# Patient Record
Sex: MALE | Race: WHITE
[De-identification: names, ages, dates, MRNs, and addresses within clinical notes are randomized per-mention and may not be internally consistent; named-entity substitution may affect disease eponyms.]

---

## 2017-06-10 ENCOUNTER — HOSPITAL ENCOUNTER (EMERGENCY)
Dept: HOSPITAL 35 - ER | Age: 52
LOS: 1 days | Discharge: HOME | End: 2017-06-11
Payer: COMMERCIAL

## 2017-06-10 VITALS — HEIGHT: 70 IN | WEIGHT: 280.01 LBS | BODY MASS INDEX: 40.09 KG/M2

## 2017-06-10 DIAGNOSIS — Z88.6: ICD-10-CM

## 2017-06-10 DIAGNOSIS — F17.210: ICD-10-CM

## 2017-06-10 DIAGNOSIS — K29.70: Primary | ICD-10-CM

## 2017-06-10 DIAGNOSIS — E11.9: ICD-10-CM

## 2017-06-10 DIAGNOSIS — I10: ICD-10-CM

## 2017-06-10 LAB
ALBUMIN SERPL-MCNC: 3.8 G/DL (ref 3.4–5)
ALP SERPL-CCNC: 154 U/L (ref 46–116)
ALT SERPL-CCNC: 90 U/L (ref 30–65)
ANION GAP SERPL CALC-SCNC: 10 MMOL/L (ref 7–16)
AST SERPL-CCNC: 47 U/L (ref 15–37)
BASOPHILS NFR BLD AUTO: 0.9 % (ref 0–2)
BILIRUB SERPL-MCNC: 0.3 MG/DL
BUN SERPL-MCNC: 8 MG/DL (ref 7–18)
CALCIUM SERPL-MCNC: 9 MG/DL (ref 8.5–10.1)
CHLORIDE SERPL-SCNC: 100 MMOL/L (ref 98–107)
CO2 SERPL-SCNC: 28 MMOL/L (ref 21–32)
CREAT SERPL-MCNC: 1 MG/DL (ref 0.7–1.3)
EOSINOPHIL NFR BLD: 1 % (ref 0–3)
ERYTHROCYTE [DISTWIDTH] IN BLOOD BY AUTOMATED COUNT: 15.1 % (ref 10.5–14.5)
GLUCOSE SERPL-MCNC: 142 MG/DL (ref 74–106)
GRANULOCYTES NFR BLD MANUAL: 67.5 % (ref 36–66)
HCT VFR BLD CALC: 46.6 % (ref 42–52)
HGB BLD-MCNC: 15.9 GM/DL (ref 14–18)
LYMPHOCYTES NFR BLD AUTO: 22.2 % (ref 24–44)
MANUAL DIFFERENTIAL PERFORMED BLD QL: NO
MCH RBC QN AUTO: 29.9 PG (ref 26–34)
MCHC RBC AUTO-ENTMCNC: 34.1 G/DL (ref 28–37)
MCV RBC: 87.6 FL (ref 80–100)
MONOCYTES NFR BLD: 8.4 % (ref 1–8)
NEUTROPHILS # BLD: 7 THOU/UL (ref 1.4–8.2)
PLATELET # BLD: 226 THOU/UL (ref 150–400)
POTASSIUM SERPL-SCNC: 4.3 MMOL/L (ref 3.5–5.1)
PROT SERPL-MCNC: 7.8 G/DL (ref 6.4–8.2)
RBC # BLD AUTO: 5.32 MIL/UL (ref 4.5–6)
SODIUM SERPL-SCNC: 138 MMOL/L (ref 136–145)
WBC # BLD AUTO: 10.4 THOU/UL (ref 4–11)

## 2017-06-11 VITALS — DIASTOLIC BLOOD PRESSURE: 84 MMHG | SYSTOLIC BLOOD PRESSURE: 156 MMHG

## 2017-06-11 LAB
AMP/METHAMP: POSITIVE
BILIRUB UR-MCNC: NEGATIVE MG/DL
COLOR UR: YELLOW
KETONES UR STRIP-MCNC: NEGATIVE MG/DL
OPIATES UR-MCNC: POSITIVE NG/ML
RBC # UR STRIP: NEGATIVE /UL
SP GR UR STRIP: 1.01 (ref 1–1.03)
URINE GLUCOSE-RANDOM*: NEGATIVE
URINE LEUKOCYTES-REFLEX: NEGATIVE
URINE PROTEIN (DIPSTICK): NEGATIVE
UROBILINOGEN UR STRIP-ACNC: 0.2 E.U./DL (ref 0.2–1)

## 2018-11-21 ENCOUNTER — HOSPITAL ENCOUNTER (EMERGENCY)
Dept: HOSPITAL 35 - ER | Age: 53
Discharge: HOME | End: 2018-11-21
Payer: COMMERCIAL

## 2018-11-21 VITALS — BODY MASS INDEX: 44.38 KG/M2 | WEIGHT: 310.01 LBS | HEIGHT: 70 IN

## 2018-11-21 VITALS — DIASTOLIC BLOOD PRESSURE: 77 MMHG | SYSTOLIC BLOOD PRESSURE: 138 MMHG

## 2018-11-21 DIAGNOSIS — F17.210: ICD-10-CM

## 2018-11-21 DIAGNOSIS — E11.9: ICD-10-CM

## 2018-11-21 DIAGNOSIS — J20.9: Primary | ICD-10-CM

## 2018-11-21 DIAGNOSIS — R07.89: ICD-10-CM

## 2018-11-21 DIAGNOSIS — Z88.6: ICD-10-CM

## 2018-11-21 DIAGNOSIS — I10: ICD-10-CM

## 2018-11-24 ENCOUNTER — HOSPITAL ENCOUNTER (EMERGENCY)
Dept: HOSPITAL 35 - ER | Age: 53
Discharge: HOME | End: 2018-11-24
Payer: COMMERCIAL

## 2018-11-24 VITALS — DIASTOLIC BLOOD PRESSURE: 88 MMHG | SYSTOLIC BLOOD PRESSURE: 153 MMHG

## 2018-11-24 VITALS — WEIGHT: 310.01 LBS | HEIGHT: 70 IN | BODY MASS INDEX: 44.38 KG/M2

## 2018-11-24 DIAGNOSIS — B96.81: Primary | ICD-10-CM

## 2018-11-24 DIAGNOSIS — I10: ICD-10-CM

## 2018-11-24 DIAGNOSIS — Z88.6: ICD-10-CM

## 2018-11-24 DIAGNOSIS — E11.9: ICD-10-CM

## 2018-11-24 DIAGNOSIS — F17.210: ICD-10-CM

## 2018-11-24 LAB
ALBUMIN SERPL-MCNC: 3.7 G/DL (ref 3.4–5)
ALT SERPL-CCNC: 67 U/L (ref 30–65)
ANION GAP SERPL CALC-SCNC: 6 MMOL/L (ref 7–16)
AST SERPL-CCNC: 33 U/L (ref 15–37)
BASOPHILS NFR BLD AUTO: 1.1 % (ref 0–2)
BILIRUB DIRECT SERPL-MCNC: < 0.1 MG/DL
BILIRUB SERPL-MCNC: 0.4 MG/DL
BILIRUB UR-MCNC: NEGATIVE MG/DL
BUN SERPL-MCNC: 20 MG/DL (ref 7–18)
CALCIUM SERPL-MCNC: 9.1 MG/DL (ref 8.5–10.1)
CHLORIDE SERPL-SCNC: 104 MMOL/L (ref 98–107)
CO2 SERPL-SCNC: 29 MMOL/L (ref 21–32)
COLOR UR: YELLOW
CREAT SERPL-MCNC: 1.1 MG/DL (ref 0.7–1.3)
EOSINOPHIL NFR BLD: 3.7 % (ref 0–3)
ERYTHROCYTE [DISTWIDTH] IN BLOOD BY AUTOMATED COUNT: 14.2 % (ref 10.5–14.5)
GLUCOSE SERPL-MCNC: 112 MG/DL (ref 74–106)
GRANULOCYTES NFR BLD MANUAL: 61.7 % (ref 36–66)
HCT VFR BLD CALC: 46.5 % (ref 42–52)
HGB BLD-MCNC: 15.8 GM/DL (ref 14–18)
KETONES UR STRIP-MCNC: NEGATIVE MG/DL
LIPASE: 202 U/L (ref 73–393)
LYMPHOCYTES NFR BLD AUTO: 22.6 % (ref 24–44)
MCH RBC QN AUTO: 29.8 PG (ref 26–34)
MCHC RBC AUTO-ENTMCNC: 33.9 G/DL (ref 28–37)
MCV RBC: 87.8 FL (ref 80–100)
MONOCYTES NFR BLD: 10.9 % (ref 1–8)
NEUTROPHILS # BLD: 5.3 THOU/UL (ref 1.4–8.2)
NITRITE UR QL STRIP: NEGATIVE
PLATELET # BLD: 262 THOU/UL (ref 150–400)
POTASSIUM SERPL-SCNC: 4.4 MMOL/L (ref 3.5–5.1)
PROT SERPL-MCNC: 8.2 G/DL (ref 6.4–8.2)
RBC # BLD AUTO: 5.29 MIL/UL (ref 4.5–6)
RBC # UR STRIP: NEGATIVE /UL
SODIUM SERPL-SCNC: 139 MMOL/L (ref 136–145)
SP GR UR STRIP: >= 1.03 (ref 1–1.03)
TROPONIN I SERPL-MCNC: <0.06 NG/ML (ref ?–0.06)
URINE CLARITY: CLEAR
URINE GLUCOSE-RANDOM*: NEGATIVE
URINE LEUKOCYTES: NEGATIVE
URINE PROTEIN (DIPSTICK): NEGATIVE
UROBILINOGEN UR STRIP-ACNC: 0.2 E.U./DL (ref 0.2–1)
WBC # BLD AUTO: 8.7 THOU/UL (ref 4–11)

## 2019-01-20 ENCOUNTER — HOSPITAL ENCOUNTER (EMERGENCY)
Dept: HOSPITAL 35 - ER | Age: 54
Discharge: HOME | End: 2019-01-20
Payer: COMMERCIAL

## 2019-01-20 VITALS — BODY MASS INDEX: 37.94 KG/M2 | WEIGHT: 265 LBS | HEIGHT: 70 IN

## 2019-01-20 VITALS — DIASTOLIC BLOOD PRESSURE: 68 MMHG | SYSTOLIC BLOOD PRESSURE: 144 MMHG
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
## 2019-01-20 DIAGNOSIS — R10.12: Primary | ICD-10-CM

## 2019-01-20 DIAGNOSIS — Z79.899: ICD-10-CM

## 2019-01-20 DIAGNOSIS — I10: ICD-10-CM
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
## 2019-01-20 DIAGNOSIS — R74.8: ICD-10-CM

## 2019-01-20 DIAGNOSIS — E11.9: ICD-10-CM

## 2019-01-20 DIAGNOSIS — Z90.49: ICD-10-CM

## 2019-01-20 DIAGNOSIS — F17.210: ICD-10-CM

## 2019-01-20 DIAGNOSIS — S22.32XA: ICD-10-CM
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
## 2019-01-20 DIAGNOSIS — Z88.6: ICD-10-CM
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
## 2019-01-20 LAB
ALBUMIN SERPL-MCNC: 3.6 G/DL (ref 3.4–5)
ALT SERPL-CCNC: 31 U/L (ref 30–65)
AMP/METHAMP: POSITIVE
ANION GAP SERPL CALC-SCNC: 13 MMOL/L (ref 7–16)
AST SERPL-CCNC: 23 U/L (ref 15–37)
BILIRUB SERPL-MCNC: 0.4 MG/DL
BILIRUB UR-MCNC: NEGATIVE MG/DL
BUN SERPL-MCNC: 17 MG/DL (ref 7–18)
CALCIUM SERPL-MCNC: 9.8 MG/DL (ref 8.5–10.1)
CHLORIDE SERPL-SCNC: 103 MMOL/L (ref 98–107)
CO2 SERPL-SCNC: 23 MMOL/L (ref 21–32)
COLOR UR: YELLOW
CREAT SERPL-MCNC: 1.5 MG/DL (ref 0.7–1.3)
ERYTHROCYTE [DISTWIDTH] IN BLOOD BY AUTOMATED COUNT: 14.9 % (ref 10.5–14.5)
GLUCOSE SERPL-MCNC: 154 MG/DL (ref 74–106)
HCT VFR BLD CALC: 47.2 % (ref 42–52)
HGB BLD-MCNC: 16.1 GM/DL (ref 14–18)
KETONES UR STRIP-MCNC: NEGATIVE MG/DL
LIPASE: 507 U/L (ref 73–393)
MCH RBC QN AUTO: 29.5 PG (ref 26–34)
MCHC RBC AUTO-ENTMCNC: 34 G/DL (ref 28–37)
MCV RBC: 86.8 FL (ref 80–100)
OPIATES UR-MCNC: POSITIVE NG/ML
PLATELET # BLD: 291 THOU/UL (ref 150–400)
POTASSIUM SERPL-SCNC: 3.8 MMOL/L (ref 3.5–5.1)
PROT SERPL-MCNC: 7.2 G/DL (ref 6.4–8.2)
RBC # BLD AUTO: 5.44 MIL/UL (ref 4.5–6)
RBC # UR STRIP: NEGATIVE /UL
SODIUM SERPL-SCNC: 139 MMOL/L (ref 136–145)
SP GR UR STRIP: 1.01 (ref 1–1.03)
TROPONIN I SERPL-MCNC: <0.06 NG/ML (ref ?–0.06)
URINE CLARITY: CLEAR
URINE GLUCOSE-RANDOM*: NEGATIVE
URINE LEUKOCYTES-REFLEX: NEGATIVE
URINE NITRITE-REFLEX: NEGATIVE
URINE PROTEIN (DIPSTICK): (no result)
UROBILINOGEN UR STRIP-ACNC: 0.2 E.U./DL (ref 0.2–1)
WBC # BLD AUTO: 9.5 THOU/UL (ref 4–11)

## 2019-01-20 NOTE — EKG
[address] [address] [address]
[place]  [address]
Phone:  [phone]                    ELECTROCARDIOGRAM REPORT      
_______________________________________________________________________________
 
Name:       FLORENITNO PERALTA MIGUEL ANGEL             [facility]                     [id number]     Account #:      [id number]  
Admission:  19    Attend Phys:                          
Discharge:              Date of Birth:  [birth year]  
                                                          Report #: [id number]
   [id number]
_______________________________________________________________________________
THIS REPORT FOR:   //name//                          
 
                         [facility] ED
                                       
Test Date:    2019               Test Time:    09:06:15
Pat Name:     FLORENTINO PERALTA             Department:   
Patient ID:   [id number]            Room:          
Gender:       M                        Technician:   as
:          1965               Requested By: Too Bal
Order Number: [id number] MD:   Shoaib Gooden
                                 Measurements
Intervals                              Axis          
Rate:         110                      P:            46
CT:           144                      QRS:          46
QRSD:         96                       T:            62
QT:           342                                    
QTc:          463                                    
                           Interpretive Statements
Sinus tachycardia
Otherwise no significant abnormality
Compared to ECG 2018 09:27:22
heart rate has increased
Electronically Signed On 2019 12:06:48 CST by Shoaib Gooden
[email or web address]
 
 
 
 
 
 
 
 
 
 
 
 
 
 
 
 
 
 
 
  <ELECTRONICALLY SIGNED>
   By: Shoaib Gooden MD, [facility]   
  19     1206
D: 19 0906                           _____________________________________
T: 19                           Shoaib Gooden MD, FACC     /EPI

## 2019-07-10 ENCOUNTER — HOSPITAL ENCOUNTER (INPATIENT)
Dept: HOSPITAL 35 - ER | Age: 54
LOS: 8 days | Discharge: HOME | DRG: 871 | End: 2019-07-18
Attending: INTERNAL MEDICINE | Admitting: INTERNAL MEDICINE
Payer: COMMERCIAL

## 2019-07-10 VITALS — DIASTOLIC BLOOD PRESSURE: 92 MMHG | SYSTOLIC BLOOD PRESSURE: 134 MMHG

## 2019-07-10 VITALS — SYSTOLIC BLOOD PRESSURE: 151 MMHG | DIASTOLIC BLOOD PRESSURE: 76 MMHG

## 2019-07-10 VITALS — WEIGHT: 309.7 LBS | BODY MASS INDEX: 44.34 KG/M2 | HEIGHT: 70 IN

## 2019-07-10 VITALS — DIASTOLIC BLOOD PRESSURE: 83 MMHG | SYSTOLIC BLOOD PRESSURE: 145 MMHG

## 2019-07-10 DIAGNOSIS — Z88.6: ICD-10-CM

## 2019-07-10 DIAGNOSIS — E11.22: ICD-10-CM

## 2019-07-10 DIAGNOSIS — F17.210: ICD-10-CM

## 2019-07-10 DIAGNOSIS — E66.01: ICD-10-CM

## 2019-07-10 DIAGNOSIS — I13.0: ICD-10-CM

## 2019-07-10 DIAGNOSIS — E88.09: ICD-10-CM
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
## 2019-07-10 DIAGNOSIS — Z71.6: ICD-10-CM

## 2019-07-10 DIAGNOSIS — L03.116: ICD-10-CM

## 2019-07-10 DIAGNOSIS — E44.0: ICD-10-CM

## 2019-07-10 DIAGNOSIS — A41.9: Primary | ICD-10-CM
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
## 2019-07-10 DIAGNOSIS — Z83.3: ICD-10-CM

## 2019-07-10 DIAGNOSIS — L03.115: ICD-10-CM
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
## 2019-07-10 DIAGNOSIS — S80.821A: ICD-10-CM

## 2019-07-10 DIAGNOSIS — N18.9: ICD-10-CM
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
## 2019-07-10 DIAGNOSIS — Z87.11: ICD-10-CM

## 2019-07-10 DIAGNOSIS — I87.2: ICD-10-CM

## 2019-07-10 DIAGNOSIS — I50.31: ICD-10-CM

## 2019-07-10 DIAGNOSIS — N17.9: ICD-10-CM

## 2019-07-10 LAB
ANION GAP SERPL CALC-SCNC: 13 MMOL/L (ref 7–16)
BASOPHILS NFR BLD AUTO: 1 % (ref 0–2)
BUN SERPL-MCNC: 13 MG/DL (ref 7–18)
CALCIUM SERPL-MCNC: 8.4 MG/DL (ref 8.5–10.1)
CHLORIDE SERPL-SCNC: 99 MMOL/L (ref 98–107)
CO2 SERPL-SCNC: 22 MMOL/L (ref 21–32)
CREAT SERPL-MCNC: 1.4 MG/DL (ref 0.7–1.3)
ERYTHROCYTE [DISTWIDTH] IN BLOOD BY AUTOMATED COUNT: 15.4 % (ref 10.5–14.5)
GLUCOSE SERPL-MCNC: 144 MG/DL (ref 74–106)
GRANULOCYTES NFR BLD MANUAL: 75 % (ref 36–66)
HCT VFR BLD CALC: 46.2 % (ref 42–52)
HGB BLD-MCNC: 15.4 GM/DL (ref 14–18)
LYMPHOCYTES NFR BLD AUTO: 6 % (ref 24–44)
MCH RBC QN AUTO: 29.2 PG (ref 26–34)
MCHC RBC AUTO-ENTMCNC: 33.4 G/DL (ref 28–37)
MCV RBC: 87.5 FL (ref 80–100)
MONOCYTES NFR BLD: 2 % (ref 1–8)
NEUTROPHILS # BLD: 19.5 THOU/UL (ref 1.4–8.2)
NEUTS BAND NFR BLD: 16 % (ref 0–8)
PLATELET # BLD EST: NORMAL 10*3/UL
PLATELET # BLD: 179 THOU/UL (ref 150–400)
POTASSIUM SERPL-SCNC: 3.9 MMOL/L (ref 3.5–5.1)
RBC # BLD AUTO: 5.28 MIL/UL (ref 4.5–6)
RBC MORPH BLD: NORMAL
SODIUM SERPL-SCNC: 134 MMOL/L (ref 136–145)
TROPONIN I SERPL-MCNC: <0.06 NG/ML (ref ?–0.06)
WBC # BLD AUTO: 21.4 THOU/UL (ref 4–11)

## 2019-07-10 PROCEDURE — 10081 I&D PILONIDAL CYST COMP: CPT

## 2019-07-10 PROCEDURE — 10783: CPT

## 2019-07-11 VITALS — SYSTOLIC BLOOD PRESSURE: 146 MMHG | DIASTOLIC BLOOD PRESSURE: 86 MMHG

## 2019-07-11 VITALS — SYSTOLIC BLOOD PRESSURE: 129 MMHG | DIASTOLIC BLOOD PRESSURE: 58 MMHG

## 2019-07-11 VITALS — SYSTOLIC BLOOD PRESSURE: 140 MMHG | DIASTOLIC BLOOD PRESSURE: 77 MMHG

## 2019-07-11 VITALS — DIASTOLIC BLOOD PRESSURE: 70 MMHG | SYSTOLIC BLOOD PRESSURE: 121 MMHG

## 2019-07-11 VITALS — SYSTOLIC BLOOD PRESSURE: 120 MMHG | DIASTOLIC BLOOD PRESSURE: 48 MMHG

## 2019-07-11 VITALS — SYSTOLIC BLOOD PRESSURE: 126 MMHG | DIASTOLIC BLOOD PRESSURE: 64 MMHG

## 2019-07-11 VITALS — DIASTOLIC BLOOD PRESSURE: 81 MMHG | SYSTOLIC BLOOD PRESSURE: 139 MMHG

## 2019-07-11 LAB
ALBUMIN SERPL-MCNC: 2.7 G/DL (ref 3.4–5)
ALT SERPL-CCNC: 47 U/L (ref 30–65)
ANION GAP SERPL CALC-SCNC: 11 MMOL/L (ref 7–16)
AST SERPL-CCNC: 24 U/L (ref 15–37)
BILIRUB SERPL-MCNC: 0.9 MG/DL
BILIRUB UR-MCNC: NEGATIVE MG/DL
BUN SERPL-MCNC: 17 MG/DL (ref 7–18)
CALCIUM SERPL-MCNC: 7.9 MG/DL (ref 8.5–10.1)
CHLORIDE SERPL-SCNC: 102 MMOL/L (ref 98–107)
CO2 SERPL-SCNC: 24 MMOL/L (ref 21–32)
COLOR UR: YELLOW
CREAT SERPL-MCNC: 1.4 MG/DL (ref 0.7–1.3)
ERYTHROCYTE [DISTWIDTH] IN BLOOD BY AUTOMATED COUNT: 15.4 % (ref 10.5–14.5)
GLUCOSE SERPL-MCNC: 117 MG/DL (ref 74–106)
HCT VFR BLD CALC: 43.7 % (ref 42–52)
HGB BLD-MCNC: 14.4 GM/DL (ref 14–18)
KETONES UR STRIP-MCNC: NEGATIVE MG/DL
MAGNESIUM SERPL-MCNC: 1.7 MG/DL (ref 1.8–2.4)
MCH RBC QN AUTO: 29.4 PG (ref 26–34)
MCHC RBC AUTO-ENTMCNC: 33 G/DL (ref 28–37)
MCV RBC: 89.2 FL (ref 80–100)
PLATELET # BLD: 168 THOU/UL (ref 150–400)
POTASSIUM SERPL-SCNC: 4.2 MMOL/L (ref 3.5–5.1)
PROT SERPL-MCNC: 7.3 G/DL (ref 6.4–8.2)
RBC # BLD AUTO: 4.89 MIL/UL (ref 4.5–6)
RBC # UR STRIP: NEGATIVE /UL
RBC #/AREA URNS HPF: (no result) /HPF (ref 0–2)
SODIUM SERPL-SCNC: 137 MMOL/L (ref 136–145)
SP GR UR STRIP: >= 1.03 (ref 1–1.03)
SQUAMOUS: (no result) /LPF (ref 0–3)
URINE CLARITY: (no result)
URINE GLUCOSE-RANDOM*: NEGATIVE
URINE LEUKOCYTES-REFLEX: NEGATIVE
URINE NITRITE-REFLEX: NEGATIVE
URINE PROTEIN (DIPSTICK): (no result)
URINE WBC-REFLEX: (no result) /HPF (ref 0–5)
UROBILINOGEN UR STRIP-ACNC: 2 E.U./DL (ref 0.2–1)
WBC # BLD AUTO: 17.1 THOU/UL (ref 4–11)

## 2019-07-12 VITALS — SYSTOLIC BLOOD PRESSURE: 151 MMHG | DIASTOLIC BLOOD PRESSURE: 73 MMHG

## 2019-07-12 VITALS — SYSTOLIC BLOOD PRESSURE: 144 MMHG | DIASTOLIC BLOOD PRESSURE: 80 MMHG

## 2019-07-12 VITALS — DIASTOLIC BLOOD PRESSURE: 88 MMHG | SYSTOLIC BLOOD PRESSURE: 102 MMHG

## 2019-07-12 VITALS — SYSTOLIC BLOOD PRESSURE: 150 MMHG | DIASTOLIC BLOOD PRESSURE: 83 MMHG

## 2019-07-12 LAB
EST. AVERAGE GLUCOSE BLD GHB EST-MCNC: 143 MG/DL
GLYCOHEMOGLOBIN (HGB A1C): 6.6 % (ref 4.8–5.6)

## 2019-07-13 VITALS — SYSTOLIC BLOOD PRESSURE: 145 MMHG | DIASTOLIC BLOOD PRESSURE: 85 MMHG

## 2019-07-13 VITALS — DIASTOLIC BLOOD PRESSURE: 85 MMHG | SYSTOLIC BLOOD PRESSURE: 145 MMHG

## 2019-07-13 VITALS — DIASTOLIC BLOOD PRESSURE: 89 MMHG | SYSTOLIC BLOOD PRESSURE: 148 MMHG

## 2019-07-13 VITALS — SYSTOLIC BLOOD PRESSURE: 129 MMHG | DIASTOLIC BLOOD PRESSURE: 60 MMHG

## 2019-07-13 VITALS — SYSTOLIC BLOOD PRESSURE: 143 MMHG | DIASTOLIC BLOOD PRESSURE: 72 MMHG

## 2019-07-13 LAB
ALBUMIN SERPL-MCNC: 2.2 G/DL (ref 3.4–5)
ALT SERPL-CCNC: 36 U/L (ref 30–65)
ANION GAP SERPL CALC-SCNC: 8 MMOL/L (ref 7–16)
ANISOCYTOSIS BLD QL SMEAR: (no result)
AST SERPL-CCNC: 37 U/L (ref 15–37)
BILIRUB SERPL-MCNC: 0.4 MG/DL
BUN SERPL-MCNC: 14 MG/DL (ref 7–18)
CALCIUM SERPL-MCNC: 8 MG/DL (ref 8.5–10.1)
CHLORIDE SERPL-SCNC: 102 MMOL/L (ref 98–107)
CO2 SERPL-SCNC: 25 MMOL/L (ref 21–32)
CREAT SERPL-MCNC: 0.9 MG/DL (ref 0.7–1.3)
ERYTHROCYTE [DISTWIDTH] IN BLOOD BY AUTOMATED COUNT: 16.1 % (ref 10.5–14.5)
GLUCOSE SERPL-MCNC: 105 MG/DL (ref 74–106)
GRANULOCYTES NFR BLD MANUAL: 77 % (ref 36–66)
HCT VFR BLD CALC: 41.6 % (ref 42–52)
HGB BLD-MCNC: 13.3 GM/DL (ref 14–18)
LYMPHOCYTES NFR BLD AUTO: 16 % (ref 24–44)
MAGNESIUM SERPL-MCNC: 1.9 MG/DL (ref 1.8–2.4)
MCH RBC QN AUTO: 29.4 PG (ref 26–34)
MCHC RBC AUTO-ENTMCNC: 32 G/DL (ref 28–37)
MCV RBC: 91.8 FL (ref 80–100)
MONOCYTES NFR BLD: 3 % (ref 1–8)
NEUTROPHILS # BLD: 7.2 THOU/UL (ref 1.4–8.2)
NEUTS BAND NFR BLD: 4 % (ref 0–8)
PLATELET # BLD: 146 THOU/UL (ref 150–400)
POTASSIUM SERPL-SCNC: 3.9 MMOL/L (ref 3.5–5.1)
PROT SERPL-MCNC: 7 G/DL (ref 6.4–8.2)
RBC # BLD AUTO: 4.53 MIL/UL (ref 4.5–6)
SODIUM SERPL-SCNC: 135 MMOL/L (ref 136–145)
WBC # BLD AUTO: 8.9 THOU/UL (ref 4–11)

## 2019-07-14 VITALS — SYSTOLIC BLOOD PRESSURE: 156 MMHG | DIASTOLIC BLOOD PRESSURE: 88 MMHG

## 2019-07-14 VITALS — SYSTOLIC BLOOD PRESSURE: 143 MMHG | DIASTOLIC BLOOD PRESSURE: 72 MMHG

## 2019-07-14 VITALS — DIASTOLIC BLOOD PRESSURE: 81 MMHG | SYSTOLIC BLOOD PRESSURE: 177 MMHG

## 2019-07-14 VITALS — DIASTOLIC BLOOD PRESSURE: 84 MMHG | SYSTOLIC BLOOD PRESSURE: 142 MMHG

## 2019-07-14 VITALS — DIASTOLIC BLOOD PRESSURE: 66 MMHG | SYSTOLIC BLOOD PRESSURE: 133 MMHG

## 2019-07-14 LAB
ANION GAP SERPL CALC-SCNC: 9 MMOL/L (ref 7–16)
BUN SERPL-MCNC: 14 MG/DL (ref 7–18)
CALCIUM SERPL-MCNC: 8.4 MG/DL (ref 8.5–10.1)
CHLORIDE SERPL-SCNC: 103 MMOL/L (ref 98–107)
CO2 SERPL-SCNC: 31 MMOL/L (ref 21–32)
CREAT SERPL-MCNC: 0.9 MG/DL (ref 0.7–1.3)
ERYTHROCYTE [DISTWIDTH] IN BLOOD BY AUTOMATED COUNT: 15.4 % (ref 10.5–14.5)
GLUCOSE SERPL-MCNC: 107 MG/DL (ref 74–106)
HCT VFR BLD CALC: 43.4 % (ref 42–52)
HGB BLD-MCNC: 14.5 GM/DL (ref 14–18)
MCH RBC QN AUTO: 29.2 PG (ref 26–34)
MCHC RBC AUTO-ENTMCNC: 33.5 G/DL (ref 28–37)
MCV RBC: 87.2 FL (ref 80–100)
PLATELET # BLD: 224 THOU/UL (ref 150–400)
POTASSIUM SERPL-SCNC: 4.2 MMOL/L (ref 3.5–5.1)
RBC # BLD AUTO: 4.98 MIL/UL (ref 4.5–6)
SODIUM SERPL-SCNC: 143 MMOL/L (ref 136–145)
WBC # BLD AUTO: 12.3 THOU/UL (ref 4–11)

## 2019-07-14 PROCEDURE — 0HBKXZZ EXCISION OF RIGHT LOWER LEG SKIN, EXTERNAL APPROACH: ICD-10-PCS | Performed by: SPECIALIST

## 2019-07-15 VITALS — DIASTOLIC BLOOD PRESSURE: 60 MMHG | SYSTOLIC BLOOD PRESSURE: 135 MMHG

## 2019-07-15 VITALS — SYSTOLIC BLOOD PRESSURE: 175 MMHG | DIASTOLIC BLOOD PRESSURE: 109 MMHG

## 2019-07-15 VITALS — SYSTOLIC BLOOD PRESSURE: 160 MMHG | DIASTOLIC BLOOD PRESSURE: 106 MMHG

## 2019-07-15 VITALS — DIASTOLIC BLOOD PRESSURE: 69 MMHG | SYSTOLIC BLOOD PRESSURE: 160 MMHG

## 2019-07-15 LAB
ANISOCYTOSIS BLD QL SMEAR: (no result)
BASOPHILS NFR BLD AUTO: 1 % (ref 0–2)
EOSINOPHIL NFR BLD: 3 % (ref 0–3)
ERYTHROCYTE [DISTWIDTH] IN BLOOD BY AUTOMATED COUNT: 15.2 % (ref 10.5–14.5)
GRANULOCYTES NFR BLD MANUAL: 69 % (ref 36–66)
HCT VFR BLD CALC: 39.3 % (ref 42–52)
HGB BLD-MCNC: 13 GM/DL (ref 14–18)
LYMPHOCYTES NFR BLD AUTO: 15 % (ref 24–44)
MCH RBC QN AUTO: 28.9 PG (ref 26–34)
MCHC RBC AUTO-ENTMCNC: 33 G/DL (ref 28–37)
MCV RBC: 87.6 FL (ref 80–100)
METAMYELOCYTES NFR BLD: 1 %
MONOCYTES NFR BLD: 10 % (ref 1–8)
NEUTROPHILS # BLD: 6.5 THOU/UL (ref 1.4–8.2)
NEUTS BAND NFR BLD: 1 % (ref 0–8)
PLATELET # BLD: 205 THOU/UL (ref 150–400)
RBC # BLD AUTO: 4.49 MIL/UL (ref 4.5–6)
WBC # BLD AUTO: 9.3 THOU/UL (ref 4–11)

## 2019-07-16 VITALS — SYSTOLIC BLOOD PRESSURE: 171 MMHG | DIASTOLIC BLOOD PRESSURE: 78 MMHG

## 2019-07-16 VITALS — SYSTOLIC BLOOD PRESSURE: 124 MMHG | DIASTOLIC BLOOD PRESSURE: 82 MMHG

## 2019-07-16 VITALS — SYSTOLIC BLOOD PRESSURE: 158 MMHG | DIASTOLIC BLOOD PRESSURE: 79 MMHG

## 2019-07-16 VITALS — SYSTOLIC BLOOD PRESSURE: 139 MMHG | DIASTOLIC BLOOD PRESSURE: 73 MMHG

## 2019-07-16 VITALS — SYSTOLIC BLOOD PRESSURE: 150 MMHG | DIASTOLIC BLOOD PRESSURE: 97 MMHG

## 2019-07-17 VITALS — SYSTOLIC BLOOD PRESSURE: 134 MMHG | DIASTOLIC BLOOD PRESSURE: 80 MMHG

## 2019-07-17 VITALS — DIASTOLIC BLOOD PRESSURE: 79 MMHG | SYSTOLIC BLOOD PRESSURE: 131 MMHG

## 2019-07-17 VITALS — SYSTOLIC BLOOD PRESSURE: 151 MMHG | DIASTOLIC BLOOD PRESSURE: 81 MMHG

## 2019-07-17 VITALS — DIASTOLIC BLOOD PRESSURE: 71 MMHG | SYSTOLIC BLOOD PRESSURE: 150 MMHG

## 2019-07-18 VITALS — SYSTOLIC BLOOD PRESSURE: 137 MMHG | DIASTOLIC BLOOD PRESSURE: 75 MMHG

## 2019-07-18 VITALS — SYSTOLIC BLOOD PRESSURE: 155 MMHG | DIASTOLIC BLOOD PRESSURE: 81 MMHG

## 2019-07-18 VITALS — DIASTOLIC BLOOD PRESSURE: 75 MMHG | SYSTOLIC BLOOD PRESSURE: 137 MMHG

## 2019-07-19 ENCOUNTER — HOSPITAL ENCOUNTER (INPATIENT)
Dept: HOSPITAL 35 - ER | Age: 54
LOS: 4 days | Discharge: HOME | DRG: 854 | End: 2019-07-23
Attending: HOSPITALIST | Admitting: HOSPITALIST
Payer: COMMERCIAL

## 2019-07-19 VITALS — DIASTOLIC BLOOD PRESSURE: 66 MMHG | SYSTOLIC BLOOD PRESSURE: 113 MMHG

## 2019-07-19 VITALS — BODY MASS INDEX: 44.67 KG/M2 | WEIGHT: 312 LBS | HEIGHT: 70 IN

## 2019-07-19 VITALS — DIASTOLIC BLOOD PRESSURE: 82 MMHG | SYSTOLIC BLOOD PRESSURE: 145 MMHG

## 2019-07-19 VITALS — DIASTOLIC BLOOD PRESSURE: 76 MMHG | SYSTOLIC BLOOD PRESSURE: 147 MMHG

## 2019-07-19 VITALS — SYSTOLIC BLOOD PRESSURE: 154 MMHG | DIASTOLIC BLOOD PRESSURE: 83 MMHG

## 2019-07-19 VITALS — SYSTOLIC BLOOD PRESSURE: 142 MMHG | DIASTOLIC BLOOD PRESSURE: 81 MMHG

## 2019-07-19 DIAGNOSIS — F17.210: ICD-10-CM

## 2019-07-19 DIAGNOSIS — E11.9: ICD-10-CM

## 2019-07-19 DIAGNOSIS — Z83.3: ICD-10-CM

## 2019-07-19 DIAGNOSIS — Z87.19: ICD-10-CM

## 2019-07-19 DIAGNOSIS — A41.9: Primary | ICD-10-CM

## 2019-07-19 DIAGNOSIS — L03.115: ICD-10-CM

## 2019-07-19 DIAGNOSIS — F41.9: ICD-10-CM

## 2019-07-19 DIAGNOSIS — R65.20: ICD-10-CM

## 2019-07-19 DIAGNOSIS — I87.2: ICD-10-CM

## 2019-07-19 DIAGNOSIS — Z79.84: ICD-10-CM

## 2019-07-19 DIAGNOSIS — K59.00: ICD-10-CM

## 2019-07-19 DIAGNOSIS — L97.819: ICD-10-CM

## 2019-07-19 DIAGNOSIS — Z79.899: ICD-10-CM

## 2019-07-19 DIAGNOSIS — L03.116: ICD-10-CM

## 2019-07-19 DIAGNOSIS — E66.01: ICD-10-CM

## 2019-07-19 DIAGNOSIS — R59.0: ICD-10-CM

## 2019-07-19 DIAGNOSIS — I10: ICD-10-CM

## 2019-07-19 DIAGNOSIS — M17.0: ICD-10-CM

## 2019-07-19 DIAGNOSIS — Z88.8: ICD-10-CM

## 2019-07-19 DIAGNOSIS — E44.0: ICD-10-CM

## 2019-07-19 DIAGNOSIS — N17.9: ICD-10-CM

## 2019-07-19 LAB
ALBUMIN SERPL-MCNC: 2.9 G/DL (ref 3.4–5)
ALT SERPL-CCNC: 85 U/L (ref 30–65)
ANION GAP SERPL CALC-SCNC: 12 MMOL/L (ref 7–16)
AST SERPL-CCNC: 41 U/L (ref 15–37)
BASOPHILS NFR BLD AUTO: 0.3 % (ref 0–2)
BILIRUB SERPL-MCNC: 0.3 MG/DL
BUN SERPL-MCNC: 32 MG/DL (ref 7–18)
CALCIUM SERPL-MCNC: 8.8 MG/DL (ref 8.5–10.1)
CHLORIDE SERPL-SCNC: 101 MMOL/L (ref 98–107)
CO2 SERPL-SCNC: 26 MMOL/L (ref 21–32)
CREAT SERPL-MCNC: 2.7 MG/DL (ref 0.7–1.3)
EOSINOPHIL NFR BLD: 0.3 % (ref 0–3)
ERYTHROCYTE [DISTWIDTH] IN BLOOD BY AUTOMATED COUNT: 15.2 % (ref 10.5–14.5)
GLUCOSE SERPL-MCNC: 122 MG/DL (ref 74–106)
GRANULOCYTES NFR BLD MANUAL: 77.8 % (ref 36–66)
HCT VFR BLD CALC: 41.7 % (ref 42–52)
HGB BLD-MCNC: 13.8 GM/DL (ref 14–18)
LYMPHOCYTES NFR BLD AUTO: 10.5 % (ref 24–44)
MCH RBC QN AUTO: 28.9 PG (ref 26–34)
MCHC RBC AUTO-ENTMCNC: 33.2 G/DL (ref 28–37)
MCV RBC: 87.2 FL (ref 80–100)
MONOCYTES NFR BLD: 11.1 % (ref 1–8)
NEUTROPHILS # BLD: 11.7 THOU/UL (ref 1.4–8.2)
PLATELET # BLD: 433 THOU/UL (ref 150–400)
POTASSIUM SERPL-SCNC: 3.8 MMOL/L (ref 3.5–5.1)
PROT SERPL-MCNC: 8.7 G/DL (ref 6.4–8.2)
RBC # BLD AUTO: 4.79 MIL/UL (ref 4.5–6)
SODIUM SERPL-SCNC: 139 MMOL/L (ref 136–145)
WBC # BLD AUTO: 15 THOU/UL (ref 4–11)

## 2019-07-19 PROCEDURE — 10080 I&D PILONIDAL CYST SIMPLE: CPT

## 2019-07-19 PROCEDURE — 27000 TENOTOMY ADDUCTOR HIP PERQ: CPT

## 2019-07-19 PROCEDURE — 10879: CPT

## 2019-07-20 VITALS — DIASTOLIC BLOOD PRESSURE: 86 MMHG | SYSTOLIC BLOOD PRESSURE: 137 MMHG

## 2019-07-20 VITALS — SYSTOLIC BLOOD PRESSURE: 146 MMHG | DIASTOLIC BLOOD PRESSURE: 93 MMHG

## 2019-07-20 VITALS — SYSTOLIC BLOOD PRESSURE: 141 MMHG | DIASTOLIC BLOOD PRESSURE: 76 MMHG

## 2019-07-20 VITALS — SYSTOLIC BLOOD PRESSURE: 126 MMHG | DIASTOLIC BLOOD PRESSURE: 76 MMHG

## 2019-07-20 LAB
ANION GAP SERPL CALC-SCNC: 10 MMOL/L (ref 7–16)
BILIRUB UR-MCNC: NEGATIVE MG/DL
BUN SERPL-MCNC: 25 MG/DL (ref 7–18)
CALCIUM SERPL-MCNC: 7.9 MG/DL (ref 8.5–10.1)
CHLORIDE SERPL-SCNC: 104 MMOL/L (ref 98–107)
CO2 SERPL-SCNC: 24 MMOL/L (ref 21–32)
COLOR UR: YELLOW
CREAT SERPL-MCNC: 1.5 MG/DL (ref 0.7–1.3)
ERYTHROCYTE [DISTWIDTH] IN BLOOD BY AUTOMATED COUNT: 15.6 % (ref 10.5–14.5)
GLUCOSE SERPL-MCNC: 124 MG/DL (ref 74–106)
HCT VFR BLD CALC: 38 % (ref 42–52)
HGB BLD-MCNC: 12.5 GM/DL (ref 14–18)
KETONES UR STRIP-MCNC: NEGATIVE MG/DL
MCH RBC QN AUTO: 28.9 PG (ref 26–34)
MCHC RBC AUTO-ENTMCNC: 33 G/DL (ref 28–37)
MCV RBC: 87.7 FL (ref 80–100)
NITRITE UR QL STRIP: NEGATIVE
PLATELET # BLD: 337 THOU/UL (ref 150–400)
POTASSIUM SERPL-SCNC: 3.8 MMOL/L (ref 3.5–5.1)
RBC # BLD AUTO: 4.33 MIL/UL (ref 4.5–6)
RBC # UR STRIP: NEGATIVE /UL
SODIUM SERPL-SCNC: 138 MMOL/L (ref 136–145)
SP GR UR STRIP: 1.02 (ref 1–1.03)
URINE CLARITY: CLEAR
URINE GLUCOSE-RANDOM*: NEGATIVE
URINE LEUKOCYTES: NEGATIVE
URINE PROTEIN (DIPSTICK): NEGATIVE
UROBILINOGEN UR STRIP-ACNC: 0.2 E.U./DL (ref 0.2–1)
WBC # BLD AUTO: 8.6 THOU/UL (ref 4–11)

## 2019-07-20 NOTE — NUR
PATIENT ALERT AND ORIENTED AND RESTING MOST OF THE DAY. SEVERAL BOWEL
MOVEMENTS TODAY BUT CONTINUE DISTENDED ABDOMEN AND FIRM. CT SCAN SHOWED
ENLARGED LYMPH NODES PER DR. NOEL. PATIENT STATES HE WOULD LIKE TO GO
HOME. HE MENTIONED HIS WIFE MAY VISIT THIS EVENING. NO INSULIN NEEDED FOR POC
BLOOD SUGARS.

## 2019-07-20 NOTE — NUR
PATIENT C/O PAIN IN ABDOMEN, THIS RN SUGGESTED TO TRY TO USE THE BATHROOM FOR
A BOWELMOVEMENT. STATED HE HAD ONE YESTERDAY. HE ISN'T CONSTIPATED AND IS
PASSING GAS.  REQUESTING ORDER FOR HEATING PAD. REFUSED MAG CITRATE. ASKING
FOR MILK AND MORE FOOD AS WELL. ENCOURAGED LIQUIDS AND GAVE HIM A POPSICLE.
WILL CONTINUE TO MONITOR.

## 2019-07-20 NOTE — NUR
ASSUMED CARE OF PATIENT AT 1900. VSS, AFEBRILE. NEW ADMISSION FROM ER AT 1830.
ABLE TO URINATE WHEN OFFERED A URINAL AND STATES HE IS PASSING GAS, DID NOT
WANT TO TAKE THE MAG CITRATE. IV FLUIDS INFUSING, EDUCATION GIVEN. PICTURES OF
LEGS TAKEN. C/O PAIN IN LEGS AND INABILITY TO SLEEP. ORDER OBTAINED FOR
TEMAZEPAM. WAS ABLE TO SLEEP, AROUSES EASILY. WILL SEND URINE SAMPLE TO LAB.
POC GOALS ESTABLISHED.

## 2019-07-21 VITALS — SYSTOLIC BLOOD PRESSURE: 148 MMHG | DIASTOLIC BLOOD PRESSURE: 93 MMHG

## 2019-07-21 VITALS — DIASTOLIC BLOOD PRESSURE: 73 MMHG | SYSTOLIC BLOOD PRESSURE: 130 MMHG

## 2019-07-21 VITALS — SYSTOLIC BLOOD PRESSURE: 153 MMHG | DIASTOLIC BLOOD PRESSURE: 89 MMHG

## 2019-07-21 VITALS — DIASTOLIC BLOOD PRESSURE: 87 MMHG | SYSTOLIC BLOOD PRESSURE: 165 MMHG

## 2019-07-21 LAB
ANION GAP SERPL CALC-SCNC: 8 MMOL/L (ref 7–16)
BASOPHILS NFR BLD AUTO: 0.8 % (ref 0–2)
BUN SERPL-MCNC: 16 MG/DL (ref 7–18)
CALCIUM SERPL-MCNC: 8.3 MG/DL (ref 8.5–10.1)
CHLORIDE SERPL-SCNC: 106 MMOL/L (ref 98–107)
CO2 SERPL-SCNC: 26 MMOL/L (ref 21–32)
CREAT SERPL-MCNC: 1.2 MG/DL (ref 0.7–1.3)
EOSINOPHIL NFR BLD: 2.3 % (ref 0–3)
ERYTHROCYTE [DISTWIDTH] IN BLOOD BY AUTOMATED COUNT: 15.5 % (ref 10.5–14.5)
GLUCOSE SERPL-MCNC: 113 MG/DL (ref 74–106)
GRANULOCYTES NFR BLD MANUAL: 65.9 % (ref 36–66)
HCT VFR BLD CALC: 40.3 % (ref 42–52)
HGB BLD-MCNC: 13.4 GM/DL (ref 14–18)
LYMPHOCYTES NFR BLD AUTO: 20.8 % (ref 24–44)
MCH RBC QN AUTO: 29.3 PG (ref 26–34)
MCHC RBC AUTO-ENTMCNC: 33.2 G/DL (ref 28–37)
MCV RBC: 88.2 FL (ref 80–100)
MONOCYTES NFR BLD: 10.2 % (ref 1–8)
NEUTROPHILS # BLD: 5.3 THOU/UL (ref 1.4–8.2)
PLATELET # BLD: 305 THOU/UL (ref 150–400)
POTASSIUM SERPL-SCNC: 4.4 MMOL/L (ref 3.5–5.1)
RBC # BLD AUTO: 4.57 MIL/UL (ref 4.5–6)
SODIUM SERPL-SCNC: 140 MMOL/L (ref 136–145)
WBC # BLD AUTO: 8.1 THOU/UL (ref 4–11)

## 2019-07-21 PROCEDURE — 05HY33Z INSERTION OF INFUSION DEVICE INTO UPPER VEIN, PERCUTANEOUS APPROACH: ICD-10-PCS | Performed by: HOSPITALIST

## 2019-07-21 NOTE — NUR
CONSULTED TO PLACE A MIDLINE FOR THIS PATIENT EXPENTED TO HAVE AN EXTENDED
STAY AND A KNOWN DIFFICULT PIV ACCESS. ORDER NOTED AND A VERBAL CONSENT
OBTAINED AFTER THE PROCEDURE AS WELL AS BENIFITS AND RISK FOR INFECTION AND
DVT DISCUSSED. THE LEFT UPPER ARM CEPHALIC WAS WIDLEY PATENT. A #4F POWER
MIDLINE WAS PLACED PER HOSPITAL POLICY AFTER A BEDSIDE TIMEOUT WAS COMPLETED.
MIDLINE TRIMMED TO 14CM AND ADVANVCED WITHOUT DIFFICULTY. BRISK BLOOD RETURN
AND FLUSH. LINE SECURED AND RELEASED FOR USE

## 2019-07-21 NOTE — NUR
PT AMBULATING TO BATHROON INDEPENDENTLY AND IS TOLERATING FAIR.  MORPHINE
PROVIDING PAIN RELIEF.  DENIES NAUSEA.  RESTING COMFORTABLY.  NO NEEDS VOICED.
CALL LIGHT WITHIN REACH.  WILL CONTINUE TO PROVIDE FREQUENT OBSERVATION.

## 2019-07-22 VITALS — DIASTOLIC BLOOD PRESSURE: 84 MMHG | SYSTOLIC BLOOD PRESSURE: 152 MMHG

## 2019-07-22 VITALS — DIASTOLIC BLOOD PRESSURE: 90 MMHG | SYSTOLIC BLOOD PRESSURE: 144 MMHG

## 2019-07-22 VITALS — SYSTOLIC BLOOD PRESSURE: 149 MMHG | DIASTOLIC BLOOD PRESSURE: 99 MMHG

## 2019-07-22 VITALS — SYSTOLIC BLOOD PRESSURE: 164 MMHG | DIASTOLIC BLOOD PRESSURE: 85 MMHG

## 2019-07-22 VITALS — DIASTOLIC BLOOD PRESSURE: 78 MMHG | SYSTOLIC BLOOD PRESSURE: 143 MMHG

## 2019-07-22 LAB
APTT BLD: 27.4 SECONDS (ref 24.5–32.8)
INR PPP: 1
PROTHROMBIN TIME: 10.2 SECONDS (ref 9.3–11.4)

## 2019-07-22 PROCEDURE — 07BH3ZX EXCISION OF RIGHT INGUINAL LYMPHATIC, PERCUTANEOUS APPROACH, DIAGNOSTIC: ICD-10-PCS | Performed by: RADIOLOGY

## 2019-07-22 NOTE — NUR
INITIAL ASSESSMENT:
SANDIP reviewed chart and spoke with nursing and attending physician. Pt was
recently discharged home on 7/18 on PO abx. Pt was admitted on 7/19 with RLE
cellulitis. Pt is currently on IV abx. Pt to have lymph node bx today. SW
attempted to meet with pt at bedside. Pt was sleeping soundly during time of
visit. Per previous admission, pt lives at home with his s/o. Prior to
admission, pt was independent with ADLs. Pt does not have health insurance and
was provided with safety net clinic info and prescription discount card. Plan
is for pt to discharge home when medically stable. SANDIP is following to assist
as needed with discharge planning.

## 2019-07-22 NOTE — NUR
PT CONTINUES TO EXPERIENCE BURNING ABDOMINAL PAIN..MEDICATED WITH MORPHINE PRN
AND AFTER DIET RESUMES MAY USE NORCO..ENCOURAGE AMBULATION..

## 2019-07-22 NOTE — NUR
Pt admitted w/ abdominal pain, no urination, constipation/obstipation. Just
dc'ed on 7/18, readmitted 7/19. EMR states RLE cellulitis, w/ RLE wounds at
various stages of healing. Received consult for recurrent infection, diabetes.
Pt seen 2x in the last week or so during previous admit by RDs. Seen again
this AM. NPO for biopsy today. Pt again denies any diabetes or healthy
eating education. Does allow RD to educate on protein importance, increased
needs, and review protein sources. Recommend 1-2 sources/meal. Plan to restart
Ensure Max daily at dinner once diet restarts (150 kcals, 30 g protein).
Appetite excellent otherwise. BGs well controlled  mg/dl thus far; on
Humalog SSI. Pt remains low nutrition risk d/t adequate po and known protein
needs.

## 2019-07-23 VITALS — DIASTOLIC BLOOD PRESSURE: 52 MMHG | SYSTOLIC BLOOD PRESSURE: 136 MMHG

## 2019-07-23 VITALS — SYSTOLIC BLOOD PRESSURE: 136 MMHG | DIASTOLIC BLOOD PRESSURE: 52 MMHG

## 2019-07-23 VITALS — SYSTOLIC BLOOD PRESSURE: 134 MMHG | DIASTOLIC BLOOD PRESSURE: 74 MMHG

## 2019-07-23 NOTE — NUR
SHIFT SUMMARY: PT PROGRESSING. ALERT/ORIENTED, ABDOMINAL DISCOMFORT CONTROLLED
WITH PO PAIN MEDICATION, SR, ANTHONY LOWER LEGS WITH SKIN INTACT, SEE ASSESSMENT
FOR DETAILS. ROOM AIR, LUNGS CLEAR, TOLERATING DIET, VOIDING PER URINAL.
DISCHARGE INSTRUCTIONS AND HAND WRITTEN SCRIPT GIVEN. PT VERBALIZED
UNDERSTANDING OF DISCHARGE INSTRUCTIONS.  L UPPER ARM MIDLINE IV, DC'D. WELL
TOLERATED. PT CALLED FAMILY FOR TRANSPORTION, THEN DISCHARGED TO HOME UPON
THEIR ARRIVAL.

## 2019-07-23 NOTE — NUR
PT IN BED RESTING. REMAINS SR ON MONITOR. PT CONTINUES ON RA. PT TO POSSIBLE
D/C HOME TODAY. PT REQUIRED A FEW DOSES OF PO PAIN MEDS THROUGHOUT SHIFT.

## 2019-07-23 NOTE — NUR
DISCHARGE NOTE:
SW reviewed chart and spoke with attending physician. Pt is medically stable
for discharge home today. Pt will follow up with oncology as an outpatient for
bx results. Pt provided with safety net clinic info last week. Pt was
discharged prior to SW visit. No additional SW needs identified at this time,
but is following to assist as needed with discharge planning.

## 2019-08-30 ENCOUNTER — HOSPITAL ENCOUNTER (INPATIENT)
Dept: HOSPITAL 35 - ER | Age: 54
LOS: 2 days | Discharge: HOME | DRG: 683 | End: 2019-09-01
Attending: INTERNAL MEDICINE | Admitting: INTERNAL MEDICINE
Payer: COMMERCIAL

## 2019-08-30 VITALS — BODY MASS INDEX: 44.81 KG/M2 | HEIGHT: 70 IN | WEIGHT: 313 LBS

## 2019-08-30 VITALS — SYSTOLIC BLOOD PRESSURE: 139 MMHG | DIASTOLIC BLOOD PRESSURE: 108 MMHG

## 2019-08-30 DIAGNOSIS — Z90.49: ICD-10-CM

## 2019-08-30 DIAGNOSIS — M19.90: ICD-10-CM

## 2019-08-30 DIAGNOSIS — F17.210: ICD-10-CM

## 2019-08-30 DIAGNOSIS — Z83.3: ICD-10-CM

## 2019-08-30 DIAGNOSIS — Z79.84: ICD-10-CM

## 2019-08-30 DIAGNOSIS — I50.32: ICD-10-CM

## 2019-08-30 DIAGNOSIS — E11.9: ICD-10-CM

## 2019-08-30 DIAGNOSIS — R10.9: ICD-10-CM

## 2019-08-30 DIAGNOSIS — I11.0: ICD-10-CM

## 2019-08-30 DIAGNOSIS — Z87.11: ICD-10-CM

## 2019-08-30 DIAGNOSIS — Z71.6: ICD-10-CM

## 2019-08-30 DIAGNOSIS — E87.2: ICD-10-CM

## 2019-08-30 DIAGNOSIS — Z91.19: ICD-10-CM

## 2019-08-30 DIAGNOSIS — Z88.6: ICD-10-CM

## 2019-08-30 DIAGNOSIS — E66.01: ICD-10-CM

## 2019-08-30 DIAGNOSIS — N17.9: Primary | ICD-10-CM

## 2019-08-30 LAB
ALBUMIN SERPL-MCNC: 4 G/DL (ref 3.4–5)
ALT SERPL-CCNC: 91 U/L (ref 30–65)
ANION GAP SERPL CALC-SCNC: 14 MMOL/L (ref 7–16)
AST SERPL-CCNC: 51 U/L (ref 15–37)
BASOPHILS NFR BLD AUTO: 0.7 % (ref 0–2)
BILIRUB DIRECT SERPL-MCNC: < 0.1 MG/DL
BILIRUB SERPL-MCNC: 0.2 MG/DL
BILIRUB UR-MCNC: (no result) MG/DL
BUN SERPL-MCNC: 28 MG/DL (ref 7–18)
CALCIUM SERPL-MCNC: 8.9 MG/DL (ref 8.5–10.1)
CHLORIDE SERPL-SCNC: 102 MMOL/L (ref 98–107)
CO2 SERPL-SCNC: 23 MMOL/L (ref 21–32)
COLOR UR: YELLOW
CREAT SERPL-MCNC: 3 MG/DL (ref 0.7–1.3)
EOSINOPHIL NFR BLD: 0.2 % (ref 0–3)
ERYTHROCYTE [DISTWIDTH] IN BLOOD BY AUTOMATED COUNT: 15.7 % (ref 10.5–14.5)
FINE GRAN CASTS #/AREA URNS LPF: (no result) /LPF
GLUCOSE SERPL-MCNC: 153 MG/DL (ref 74–106)
GRANULOCYTES NFR BLD MANUAL: 76.6 % (ref 36–66)
HCT VFR BLD CALC: 46.9 % (ref 42–52)
HGB BLD-MCNC: 15.7 GM/DL (ref 14–18)
HYALINE CASTS #/AREA URNS LPF: (no result) /LPF
KETONES UR STRIP-MCNC: NEGATIVE MG/DL
LIPASE: 97 U/L (ref 73–393)
LYMPHOCYTES NFR BLD AUTO: 13.9 % (ref 24–44)
MCH RBC QN AUTO: 29.3 PG (ref 26–34)
MCHC RBC AUTO-ENTMCNC: 33.4 G/DL (ref 28–37)
MCV RBC: 87.6 FL (ref 80–100)
MONOCYTES NFR BLD: 8.6 % (ref 1–8)
MUCUS: (no result) STRN/LPF
NEUTROPHILS # BLD: 11.1 THOU/UL (ref 1.4–8.2)
PLATELET # BLD: 234 THOU/UL (ref 150–400)
POTASSIUM SERPL-SCNC: 3.8 MMOL/L (ref 3.5–5.1)
PROT SERPL-MCNC: 9 G/DL (ref 6.4–8.2)
RBC # BLD AUTO: 5.36 MIL/UL (ref 4.5–6)
RBC # UR STRIP: NEGATIVE /UL
SODIUM SERPL-SCNC: 139 MMOL/L (ref 136–145)
SP GR UR STRIP: >= 1.03 (ref 1–1.03)
SQUAMOUS: (no result) /LPF (ref 0–3)
URIC ACID CRYSTALS: (no result) /LPF
URINE CLARITY: (no result)
URINE GLUCOSE-RANDOM*: NEGATIVE
URINE LEUKOCYTES-REFLEX: NEGATIVE
URINE NITRITE-REFLEX: NEGATIVE
URINE PROTEIN (DIPSTICK): (no result)
UROBILINOGEN UR STRIP-ACNC: 0.2 E.U./DL (ref 0.2–1)
WBC # BLD AUTO: 15.7 THOU/UL (ref 4–11)

## 2019-08-30 PROCEDURE — 27000 TENOTOMY ADDUCTOR HIP PERQ: CPT

## 2019-08-30 PROCEDURE — 10084: CPT

## 2019-08-31 VITALS — SYSTOLIC BLOOD PRESSURE: 141 MMHG | DIASTOLIC BLOOD PRESSURE: 71 MMHG

## 2019-08-31 VITALS — SYSTOLIC BLOOD PRESSURE: 128 MMHG | DIASTOLIC BLOOD PRESSURE: 67 MMHG

## 2019-08-31 VITALS — SYSTOLIC BLOOD PRESSURE: 155 MMHG | DIASTOLIC BLOOD PRESSURE: 83 MMHG

## 2019-08-31 VITALS — DIASTOLIC BLOOD PRESSURE: 76 MMHG | SYSTOLIC BLOOD PRESSURE: 140 MMHG

## 2019-08-31 LAB
ALBUMIN SERPL-MCNC: 3 G/DL (ref 3.4–5)
ALT SERPL-CCNC: 69 U/L (ref 30–65)
AMP/METHAMP: POSITIVE
ANION GAP SERPL CALC-SCNC: 12 MMOL/L (ref 7–16)
AST SERPL-CCNC: 39 U/L (ref 15–37)
BILIRUB SERPL-MCNC: 0.3 MG/DL
BUN SERPL-MCNC: 26 MG/DL (ref 7–18)
CALCIUM SERPL-MCNC: 7.7 MG/DL (ref 8.5–10.1)
CHLORIDE SERPL-SCNC: 106 MMOL/L (ref 98–107)
CHOLEST SERPL-MCNC: 175 MG/DL (ref ?–200)
CO2 SERPL-SCNC: 23 MMOL/L (ref 21–32)
CREAT SERPL-MCNC: 1.9 MG/DL (ref 0.7–1.3)
ERYTHROCYTE [DISTWIDTH] IN BLOOD BY AUTOMATED COUNT: 15.7 % (ref 10.5–14.5)
GLUCOSE SERPL-MCNC: 129 MG/DL (ref 74–106)
HCT VFR BLD CALC: 41 % (ref 42–52)
HDLC SERPL-MCNC: 25 MG/DL (ref 40–?)
HGB BLD-MCNC: 13.5 GM/DL (ref 14–18)
LDLC SERPL-MCNC: 121 MG/DL (ref ?–100)
MCH RBC QN AUTO: 29 PG (ref 26–34)
MCHC RBC AUTO-ENTMCNC: 33.1 G/DL (ref 28–37)
MCV RBC: 87.6 FL (ref 80–100)
PLATELET # BLD: 229 THOU/UL (ref 150–400)
POTASSIUM SERPL-SCNC: 3.9 MMOL/L (ref 3.5–5.1)
PROT SERPL-MCNC: 7.1 G/DL (ref 6.4–8.2)
RBC # BLD AUTO: 4.67 MIL/UL (ref 4.5–6)
SODIUM SERPL-SCNC: 141 MMOL/L (ref 136–145)
TC:HDL: 7 RATIO
TRIGL SERPL-MCNC: 147 MG/DL (ref ?–150)
VLDLC SERPL CALC-MCNC: 29 MG/DL (ref ?–40)
WBC # BLD AUTO: 8 THOU/UL (ref 4–11)

## 2019-08-31 NOTE — NUR
Pt came to unit from ED approx 1315. A&ox4. Denies pain. Admission completed.
IVF infusing.  ordered UDS. Waiting to be collected when patient voids next.
Ambulates with steady gait. Comes from home. Call light within reach. Will
continue to monitor.

## 2019-09-01 VITALS — DIASTOLIC BLOOD PRESSURE: 82 MMHG | SYSTOLIC BLOOD PRESSURE: 140 MMHG

## 2019-09-01 VITALS — SYSTOLIC BLOOD PRESSURE: 144 MMHG | DIASTOLIC BLOOD PRESSURE: 86 MMHG

## 2019-09-01 VITALS — SYSTOLIC BLOOD PRESSURE: 140 MMHG | DIASTOLIC BLOOD PRESSURE: 82 MMHG

## 2019-09-01 LAB
ANION GAP SERPL CALC-SCNC: 6 MMOL/L (ref 7–16)
BUN SERPL-MCNC: 18 MG/DL (ref 7–18)
CALCIUM SERPL-MCNC: 8.3 MG/DL (ref 8.5–10.1)
CHLORIDE SERPL-SCNC: 107 MMOL/L (ref 98–107)
CO2 SERPL-SCNC: 26 MMOL/L (ref 21–32)
CREAT SERPL-MCNC: 1.2 MG/DL (ref 0.7–1.3)
EST. AVERAGE GLUCOSE BLD GHB EST-MCNC: 128 MG/DL
GLUCOSE SERPL-MCNC: 92 MG/DL (ref 74–106)
GLYCOHEMOGLOBIN (HGB A1C): 6.1 % (ref 4.8–5.6)
POTASSIUM SERPL-SCNC: 4.3 MMOL/L (ref 3.5–5.1)
SODIUM SERPL-SCNC: 139 MMOL/L (ref 136–145)

## 2019-09-01 NOTE — NUR
NOTIFIED TO DRAW LABS FOR THIS PATIENT. ON EACH ADMIT HE IS STUCK MULTIPLE
TIMES FOR PIV LINES AND LABS. HE IS REQUESTING A MIDLINE. AGREE TO MIDLINE
PLACEMENT. A #4F POWER MIDLINE WAS PLACED PER POLICY. LINE WAS TRIMMED TO 14CM
AND ADVANCED WITHOUT DIFFICULTY. BLOOD DRAWN AND LINE SECURED

## 2019-09-01 NOTE — NUR
7am-1pm
Received awake on bed. Due medications given as prescribed. A+Ox4. On room
air. On blood sugar monitoring- taken and recorded accordingly, with sliding
scale insulin prescribed. With NS at 125cc/hr infusing well at R FA. Pt up ad
christina. Assisted in ADLs. With blood works ordered for today, laboratory staff
called and mentioned that they are having a hard time taking bloods from pt
and pt mentioned he doesn't want to be stuck repeatedly, called IV nurse to
ask help drawing bloods, a/w call back, if still unable to get bloods will ask
doctor to order midline/PICC line instead. Vital signs stabe. No complaints of
pain.

## 2019-09-01 NOTE — NUR
SHIFT SUMMARY: PT IS ALERT AND ORINTED. UP AD MONIQUE. CONTINUES ON IV FLUIDS.
VOIDING LOTS. AFEBRILE. SOME EDEMA AND ERYTHEMA TO BLE.

## 2019-09-03 NOTE — EKG
[address]
[place]  [address]
Phone:  [phone]                    ELECTROCARDIOGRAM REPORT      
_______________________________________________________________________________
 
Name:       FLORENTINO PERALTA MIGUEL ANGEL             Room #:         423-1       [facility] [place]  
M.R.#:      [id number]     Account #:      [id number]  
Admission:  19    Attend Phys:    Yohannes Rodney
Discharge:  19    Date of Birth:  [birth year]  
                                                          Report #: [id number]
   [id number]
_______________________________________________________________________________
THIS REPORT FOR:   //name//                          
 
                         [facility] ED
                                       
Test Date:    2019               Test Time:    16:20:34
Pat Name:     FLORENTINO PERALTA             Department:   
Patient ID:   [id number]            Room:         [facility]
Gender:       M                        Technician:   BEBA
:          1965               Requested By: Jack Townsend
Order Number: [id number] MD:   Shoaib Gooden
                                 Measurements
Intervals                              Axis          
Rate:         117                      P:            31
NV:           142                      QRS:          60
QRSD:         91                       T:            62
QT:           333                                    
QTc:          465                                    
                           Interpretive Statements
Sinus tachycardia
Otherwise normal tracing
Compared to ECG 07/10/2019 21:45:25
No significant changes
 
Electronically Signed On 9-3-2019 7:42:38 CDT by Shoaib Gooden
[email or web address]
 
 
 
 
 
 
 
 
 
 
 
 
 
 
 
 
 
 
  <ELECTRONICALLY SIGNED>
   By: Shoaib Gooden MD, [facility]   
  19     0742
D: 19                           _____________________________________
T: 19                           Shoaib Gooden MD, [facility]     /EPI

## 2020-01-06 ENCOUNTER — HOSPITAL ENCOUNTER (INPATIENT)
Dept: HOSPITAL 35 - ER | Age: 55
LOS: 2 days | Discharge: HOME | DRG: 603 | End: 2020-01-08
Attending: HOSPITALIST | Admitting: HOSPITALIST
Payer: COMMERCIAL

## 2020-01-06 VITALS — HEIGHT: 70 IN | WEIGHT: 315 LBS | BODY MASS INDEX: 45.1 KG/M2

## 2020-01-06 VITALS — DIASTOLIC BLOOD PRESSURE: 86 MMHG | SYSTOLIC BLOOD PRESSURE: 154 MMHG

## 2020-01-06 VITALS — SYSTOLIC BLOOD PRESSURE: 160 MMHG | DIASTOLIC BLOOD PRESSURE: 113 MMHG

## 2020-01-06 VITALS — DIASTOLIC BLOOD PRESSURE: 101 MMHG | SYSTOLIC BLOOD PRESSURE: 162 MMHG

## 2020-01-06 DIAGNOSIS — Z87.11: ICD-10-CM

## 2020-01-06 DIAGNOSIS — I87.2: ICD-10-CM

## 2020-01-06 DIAGNOSIS — I50.30: ICD-10-CM

## 2020-01-06 DIAGNOSIS — E66.01: ICD-10-CM

## 2020-01-06 DIAGNOSIS — L03.115: Primary | ICD-10-CM

## 2020-01-06 DIAGNOSIS — Z88.6: ICD-10-CM

## 2020-01-06 DIAGNOSIS — Z23: ICD-10-CM

## 2020-01-06 DIAGNOSIS — I11.0: ICD-10-CM

## 2020-01-06 DIAGNOSIS — M19.90: ICD-10-CM

## 2020-01-06 DIAGNOSIS — F17.210: ICD-10-CM

## 2020-01-06 DIAGNOSIS — Z79.4: ICD-10-CM

## 2020-01-06 DIAGNOSIS — L97.919: ICD-10-CM

## 2020-01-06 DIAGNOSIS — Z87.81: ICD-10-CM

## 2020-01-06 DIAGNOSIS — E11.9: ICD-10-CM

## 2020-01-06 LAB
ALBUMIN SERPL-MCNC: 3.4 G/DL (ref 3.4–5)
ALT SERPL-CCNC: 44 U/L (ref 30–65)
ANION GAP SERPL CALC-SCNC: 7 MMOL/L (ref 7–16)
AST SERPL-CCNC: 26 U/L (ref 15–37)
BASOPHILS NFR BLD AUTO: 0.8 % (ref 0–2)
BILIRUB SERPL-MCNC: 0.3 MG/DL
BUN SERPL-MCNC: 15 MG/DL (ref 7–18)
CALCIUM SERPL-MCNC: 8.9 MG/DL (ref 8.5–10.1)
CHLORIDE SERPL-SCNC: 105 MMOL/L (ref 98–107)
CO2 SERPL-SCNC: 29 MMOL/L (ref 21–32)
CREAT SERPL-MCNC: 1.1 MG/DL (ref 0.7–1.3)
EOSINOPHIL NFR BLD: 1.3 % (ref 0–3)
ERYTHROCYTE [DISTWIDTH] IN BLOOD BY AUTOMATED COUNT: 14.8 % (ref 10.5–14.5)
GLUCOSE SERPL-MCNC: 119 MG/DL (ref 74–106)
GRANULOCYTES NFR BLD MANUAL: 68.1 % (ref 36–66)
HCT VFR BLD CALC: 45.8 % (ref 42–52)
HGB BLD-MCNC: 14.6 GM/DL (ref 14–18)
LYMPHOCYTES NFR BLD AUTO: 20.4 % (ref 24–44)
MCH RBC QN AUTO: 27.9 PG (ref 26–34)
MCHC RBC AUTO-ENTMCNC: 31.9 G/DL (ref 28–37)
MCV RBC: 87.6 FL (ref 80–100)
MONOCYTES NFR BLD: 9.4 % (ref 1–8)
NEUTROPHILS # BLD: 6.3 THOU/UL (ref 1.4–8.2)
PLATELET # BLD: 244 THOU/UL (ref 150–400)
POTASSIUM SERPL-SCNC: 4.3 MMOL/L (ref 3.5–5.1)
PROT SERPL-MCNC: 8 G/DL (ref 6.4–8.2)
RBC # BLD AUTO: 5.23 MIL/UL (ref 4.5–6)
SODIUM SERPL-SCNC: 141 MMOL/L (ref 136–145)
WBC # BLD AUTO: 9.3 THOU/UL (ref 4–11)

## 2020-01-06 PROCEDURE — 10040 EXTRACTION: CPT

## 2020-01-06 NOTE — NUR
ED NURSE CALLED TO GIVE REPORT, WAS TOLD BY YAKOV "I JUST FOUND OUT ABOUT IT
LET ME CALL YOU BACK" ED NURSE ADVISED HER THAT WE WILL NEED TO MOVE THE PT
QUICKLY, NURSE ERNESTO

## 2020-01-06 NOTE — NUR
1455- PT. REQUESTS TO SEE NURSE BECAUSE HE IS ITCHING. PT. HAS DIFUSE ERRYTHEMA
TO THE UPPER TORSO AND FACE. CONTACTED JORDAN FNP FOR FURTHER ORDERS AND
PROVIDED BENADRYL AS ORDERED. SYMPTOMS IMPROVED WITHIN 20 MINUTES.

## 2020-01-07 VITALS — DIASTOLIC BLOOD PRESSURE: 73 MMHG | SYSTOLIC BLOOD PRESSURE: 132 MMHG

## 2020-01-07 VITALS — DIASTOLIC BLOOD PRESSURE: 88 MMHG | SYSTOLIC BLOOD PRESSURE: 162 MMHG

## 2020-01-07 VITALS — SYSTOLIC BLOOD PRESSURE: 150 MMHG | DIASTOLIC BLOOD PRESSURE: 75 MMHG

## 2020-01-07 LAB
ANION GAP SERPL CALC-SCNC: 5 MMOL/L (ref 7–16)
BUN SERPL-MCNC: 16 MG/DL (ref 7–18)
CALCIUM SERPL-MCNC: 8.4 MG/DL (ref 8.5–10.1)
CHLORIDE SERPL-SCNC: 102 MMOL/L (ref 98–107)
CO2 SERPL-SCNC: 29 MMOL/L (ref 21–32)
CREAT SERPL-MCNC: 1.1 MG/DL (ref 0.7–1.3)
ERYTHROCYTE [DISTWIDTH] IN BLOOD BY AUTOMATED COUNT: 14.7 % (ref 10.5–14.5)
GLUCOSE SERPL-MCNC: 92 MG/DL (ref 74–106)
HCT VFR BLD CALC: 43 % (ref 42–52)
HGB BLD-MCNC: 13.9 GM/DL (ref 14–18)
MCH RBC QN AUTO: 28.5 PG (ref 26–34)
MCHC RBC AUTO-ENTMCNC: 32.5 G/DL (ref 28–37)
MCV RBC: 87.8 FL (ref 80–100)
PLATELET # BLD: 234 THOU/UL (ref 150–400)
POTASSIUM SERPL-SCNC: 4.1 MMOL/L (ref 3.5–5.1)
RBC # BLD AUTO: 4.89 MIL/UL (ref 4.5–6)
SODIUM SERPL-SCNC: 136 MMOL/L (ref 136–145)
WBC # BLD AUTO: 6.9 THOU/UL (ref 4–11)

## 2020-01-07 NOTE — NUR
PT ADMITTED RELATED TO Right Lower Extremety Cellulitis w/ open wounds. CM
REVIEWED CHART AND SPOKE WITH CARE TEAM. CM MET WITH PT AT BEDSIDE THIS DAY.
PT IS A&O X4. CM ROLE INTRODCUED. PT INDICATED HE LIVES IN A HOUSE WITH SHANICE HYDE AND HER FATHER. PT INDICATED 3 STEPS TO ENTER AND NONE INSIDE. PT
INDICATED HE HAD BEEN INDEPDENET WITH GAIT AND ADLS PTA. PT INDICATED NO HH OR
OP THERAPY HX. PT INDICATED HE HAS A PCP AT [facility]. PT INDICATED HE PLANS
TO RETURN HOME ONCE MEDICALLY STABLE. CM TO FOLLOW AS INDICATED WITH DC
PLANNING.

## 2020-01-07 NOTE — NUR
Assumed pt care at 7am.Pt in bed sleeping on and off.Assessment completed.vss.
Pt tolerated med and diet.Pain med give x2 this shift for rt leg pain.Dr Lee here,order noted.Martha change done to rt leg as ordered.Will continue
to monitor.

## 2020-01-07 NOTE — NUR
Nutrition: pt admitted with right lower extremity cellulitis with open
wounds. Wound care consult. Familiar with pt from prior admits. Stable wts
since summer. BMI > 40, extreme class 3 obesity. Pt has refused dietary
education and past for weight loss/DM. Continues to deny needs. Did review
protein needs with pt and will start ensure max daily. BG 86-96 and A1C 6.1.
Typical appetite excellent. Low nutrition risk with interventions in place.

## 2020-01-07 NOTE — NUR
admit
 
pt admitted to room 457 from ed being admitted with right lower extremety
cellulitis. admission completed. iv antibiotics administered as ordered.
taking hydrocodone for pain with effect. wounds cleansed with normal saline
xeroform gauze applied and wrapped with kerlix, photos taken per protocal see
chart.

## 2020-01-08 VITALS — SYSTOLIC BLOOD PRESSURE: 148 MMHG | DIASTOLIC BLOOD PRESSURE: 94 MMHG

## 2020-01-08 VITALS — SYSTOLIC BLOOD PRESSURE: 143 MMHG | DIASTOLIC BLOOD PRESSURE: 98 MMHG

## 2020-01-08 VITALS — DIASTOLIC BLOOD PRESSURE: 81 MMHG | SYSTOLIC BLOOD PRESSURE: 153 MMHG

## 2020-01-08 VITALS — SYSTOLIC BLOOD PRESSURE: 153 MMHG | DIASTOLIC BLOOD PRESSURE: 81 MMHG

## 2020-01-08 NOTE — HC
[facility]
[address]
[place]   [address]                     CONSULTATION                  
_______________________________________________________________________________
 
Name:       FLORENTINO PERALTA MIGUEL ANGEL             Room #:         457-P       [facility] [place]  
..#:      [id number]                       Account #:      [id number]  
Admission:  01/06/20    Attend Phys:    Pepe Dave MD    
Discharge:  01/08/20    Date of Birth:  [birth year]  
                                                          Report #: [id number]
                                                                    [id number]   
_______________________________________________________________________________
THIS REPORT FOR:   //name//                          
 
CC: Pepe Dave
    [facility] physician/PCP
    NO PCP
 
DATE OF SERVICE:  01/07/2020
 
 
CHIEF COMPLAINT:  Cellulitis and venous ulcerations to the right leg.
 
HISTORY OF PRESENT ILLNESS:  This is a 54-year-old male patient with whom I am
familiar from previous hospitalizations.  He developed some swelling and
drainage from his right leg about a week ago.  It has become progressively
severe and he presented to the Emergency Department and has been admitted.  He
has been started on intravenous vancomycin.  He states that he is still having
some pain and drainage, but overall feels pretty well.  He denies any current
fever or chills.
 
PAST MEDICAL HISTORY:  Positive for diabetes mellitus, morbid obesity,
hypertension, ongoing venous dermatitis of both lower extremities, history of
duodenal ulcer, acute kidney injury, history of diastolic heart failure.
 
SOCIAL HISTORY:  The patient denies alcohol use.  He has been smoking cigarettes
1 pack per day for 39 years and currently smokes daily.  No recreational drug
use.
 
FAMILY HISTORY:  Noncontributory and negative for diabetes in his father. 
Positive for diabetes in his mother.
 
REVIEW OF SYSTEMS:
CONSTITUTIONAL:  The patient denies fever, chills or weight loss.
NEUROLOGICAL:  The patient denies focal weakness, numbness or tingling.
EYES:  The patient denies visual changes, redness, or drainage.
ENT:  The patient denies earache, nasal drainage, sore throat.
CARDIOVASCULAR:  The patient denies chest pain or palpitations or diaphoresis.
PULMONARY:  The patient denies cough or shortness of breath.
GASTROINTESTINAL:  Denies nausea, vomiting, diarrhea or abdominal pain.
ORTHOPEDIC:  The patient complains of pain, swelling, drainage from the right
lower extremity.
SKIN:  The patient notes the ulcerations of his right leg.  Denies other rashes.
PSYCHIATRIC:  The patient denies anxiety, irritability, depression.
ENDOCRINE:  The patient denies heat or cold intolerance, polydipsia, or
polyphagia.
Other systems in a 14-point review of systems are negative.
 
 
 
 
[address]   [address]                     CONSULTATION                  
_______________________________________________________________________________
 
Name:       FLORENTINO PERALTA MIGUEL ANGEL             Room #:         457-P       [facility] [place]  
M.R.#:      [id number]                       Account #:      [id number]  
Admission:  01/06/20    Attend Phys:    Pepe Dave MD    
Discharge:  01/08/20    Date of Birth:  [birth year]  
                                                          Report #: [id number]
                                                                    [id number]   
_______________________________________________________________________________
PHYSICAL EXAMINATION:
VITAL SIGNS:  At this time include temperature 36.3, pulse 97, respiratory rate
18, blood pressure 162/88.
GENERAL:  This is a well-developed, well-nourished patient who appears to be in
minimal distress.
HEENT:  Head normocephalic.  Nose and throat are clear.
NECK:  Supple.
LUNGS:  Clear.
HEART:  ____.
ABDOMEN:  Bowel sounds present.
EXTREMITIES:  Lower extremities demonstrate 2-3+ edema bilaterally.  He has
chronic hyperpigmentation and hemosiderin staining to both lower extremities
consistent with chronic venous stasis dermatitis.  He has 2 areas of open
ulceration on the right lower leg.  Both have some odor and some yellowish
drainage.  The wound bases are relatively clean with some granulation tissue and
a little bit of slough around the edges.  The leg itself is cellulitic and warm
and red to palpation proximal to the ulcerations.
NEUROLOGIC:  The patient is alert and oriented and appropriate.  Moving all 4
extremities spontaneously.
 
LABORATORY DATA:  White blood cell count on admission was 9.3, currently 6.9;
hemoglobin 13.9.  Sodium 136, potassium 4.1, chloride 102, CO2 of 29, BUN 16,
creatinine 1.1, calcium is 8.4, albumin is 3.4.
 
CLINICAL IMPRESSION:
1.  Venous ulcerations to the right lower extremity.
2.  Cellulitis and wound infection, right lower extremity.
3.  Venous stasis dermatitis, bilateral lower extremities.
4.  Diabetes mellitus.
5.  Morbid obesity.
 
RECOMMENDATIONS:  At this point in time, empiric antibiotic therapy would be
appropriate, pending culture and sensitivity.  We will recommend topical
gentamicin ointment, Xeroform gauze, ABD and then Kerlix and Ace.  Recommend
AmLactin lotion to the dry skin of both legs.  Elevation of the lower
extremities would be appropriate as well.  We will begin with gentle
compression.  As the cellulitis resolves, we may become more aggressive to
control edema and help with wound healing.
 
I appreciate being asked to see the patient in consultation.
 
 
 
 
  <ELECTRONICALLY SIGNED>
   By: Eric Lee MD        
  01/08/20     1842
D: 01/07/20 1600                           _____________________________________
T: 01/07/20 3945                           Eric Lee MD          /nt

## 2020-01-08 NOTE — NUR
DISCHARGE PLANNING.  DISCHARGE PLAN TO HOME WITH HOME HEALTH. PATIENT REFERRAL
FAXED TO [facility] PER REQUEST.  CALL PLACED TO [facility], SPOKE WITH DARIAN.
NOTIFIED OF REFERRAL. FOLLOWING.

## 2020-01-08 NOTE — NUR
WOUND CONSULT;
ROUNDING WITH DR RUFF. THE RIGHT LE WAS ASSESSED AND WAS VERY PAINFUL.  THE
WOUNDS ARE .
 
RECOMMENDATION; CONTINUE CURRENT TREATMENT

## 2020-01-08 NOTE — NUR
PT ALERT AND ORIENTED TIMES FOUR. VSS, PT C/O PAIN PRN PAIN MEDICATIONS GIVEN
WITH GOOD RELEIF. PT UP WALKING AROUND THE ROOM WITH STEADY GAIT. PT TOLERATES
MEDS AND MEALS. PT PROGRESSING TOWRADS POC GOALS.

## 2020-01-08 NOTE — NUR
CARE TEAM INDICATED THAT PT IS MEDICALLY STABLE TO DISCHARGE HOME THIS DAY. PT
IS TO DC ON PO ABX. CARE TEAM INDICATED THAT PT IS TO FOLLOW UP ON AN OP BASIS
FOR WOUND CARE. NO OTHER CM INTERVENTION INDICATED CASE CLOSED.

## 2020-01-08 NOTE — NUR
ASSUMED PT CARE AT 1900. PT REQUESTED PAIN MEDS SAYING AM NURSE TOLD HIM HE
WAS DUE AT 1930, EMAR REFLECTED NOT DUE UNTIL 2200. PT VERY UPSET, REFUSED
RREST OF MEDS. ALSO REFUSED MY ASSESSMENT. LAB UP TO DRAW BLOOD, TOLD HER HE
WAS FRUSTRATED WITH THE DAY HE HAD. SPOKE WITH PT, AGREED TO ALLOW ME TO GIVE
HIM ORDERED VANCO. CCALLED LATER IN SHIFT FOR PAIN MEDS. PT SLEEPING HEAVILY
MOST THE SHIFT. WILL CONTINUE TO MONITOR.

## 2020-01-13 ENCOUNTER — HOSPITAL ENCOUNTER (EMERGENCY)
Dept: HOSPITAL 35 - ER | Age: 55
Discharge: HOME | End: 2020-01-13
Payer: COMMERCIAL

## 2020-01-13 VITALS — HEIGHT: 70 IN | BODY MASS INDEX: 45.1 KG/M2 | WEIGHT: 315 LBS

## 2020-01-13 VITALS — SYSTOLIC BLOOD PRESSURE: 158 MMHG | DIASTOLIC BLOOD PRESSURE: 92 MMHG

## 2020-01-13 DIAGNOSIS — L03.115: ICD-10-CM
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
## 2020-01-13 DIAGNOSIS — E11.9: ICD-10-CM

## 2020-01-13 DIAGNOSIS — Z88.6: ICD-10-CM

## 2020-01-13 DIAGNOSIS — L27.0: Primary | ICD-10-CM

## 2020-01-13 DIAGNOSIS — T37.0X5A: ICD-10-CM

## 2020-01-13 DIAGNOSIS — M19.90: ICD-10-CM

## 2020-01-13 DIAGNOSIS — I10: ICD-10-CM

## 2020-01-13 DIAGNOSIS — F17.210: ICD-10-CM

## 2020-01-23 ENCOUNTER — HOSPITAL ENCOUNTER (EMERGENCY)
Dept: HOSPITAL 35 - ER | Age: 55
Discharge: HOME | End: 2020-01-23
Payer: COMMERCIAL

## 2020-01-23 VITALS — BODY MASS INDEX: 45.1 KG/M2 | HEIGHT: 70 IN | WEIGHT: 315 LBS

## 2020-01-23 VITALS — DIASTOLIC BLOOD PRESSURE: 93 MMHG | SYSTOLIC BLOOD PRESSURE: 165 MMHG

## 2020-01-23 DIAGNOSIS — L25.1: Primary | ICD-10-CM

## 2020-01-23 DIAGNOSIS — T36.8X5A: ICD-10-CM
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
## 2020-01-23 LAB
ALBUMIN SERPL-MCNC: 3.4 G/DL (ref 3.4–5)
ALT SERPL-CCNC: 46 U/L (ref 30–65)
ANION GAP SERPL CALC-SCNC: 11 MMOL/L (ref 7–16)
APTT BLD: 29.6 SECONDS (ref 24.5–32.8)
AST SERPL-CCNC: 24 U/L (ref 15–37)
BASOPHILS NFR BLD AUTO: 0.9 % (ref 0–2)
BILIRUB DIRECT SERPL-MCNC: < 0.1 MG/DL
BILIRUB SERPL-MCNC: 0.3 MG/DL
BUN SERPL-MCNC: 16 MG/DL (ref 7–18)
CALCIUM SERPL-MCNC: 9 MG/DL (ref 8.5–10.1)
CHLORIDE SERPL-SCNC: 104 MMOL/L (ref 98–107)
CO2 SERPL-SCNC: 26 MMOL/L (ref 21–32)
CREAT SERPL-MCNC: 1.1 MG/DL (ref 0.7–1.3)
EOSINOPHIL NFR BLD: 2 % (ref 0–3)
ERYTHROCYTE [DISTWIDTH] IN BLOOD BY AUTOMATED COUNT: 15 % (ref 10.5–14.5)
GLUCOSE SERPL-MCNC: 102 MG/DL (ref 74–106)
GRANULOCYTES NFR BLD MANUAL: 61.7 % (ref 36–66)
HCT VFR BLD CALC: 45.1 % (ref 42–52)
HGB BLD-MCNC: 14.6 GM/DL (ref 14–18)
INR PPP: 1
LYMPHOCYTES NFR BLD AUTO: 23.4 % (ref 24–44)
MCH RBC QN AUTO: 28.1 PG (ref 26–34)
MCHC RBC AUTO-ENTMCNC: 32.4 G/DL (ref 28–37)
MCV RBC: 86.7 FL (ref 80–100)
MONOCYTES NFR BLD: 12 % (ref 1–8)
NEUTROPHILS # BLD: 6 THOU/UL (ref 1.4–8.2)
PLATELET # BLD: 247 THOU/UL (ref 150–400)
POTASSIUM SERPL-SCNC: 4.1 MMOL/L (ref 3.5–5.1)
PROT SERPL-MCNC: 8 G/DL (ref 6.4–8.2)
PROTHROMBIN TIME: 10 SECONDS (ref 9.3–11.4)
RBC # BLD AUTO: 5.2 MIL/UL (ref 4.5–6)
SODIUM SERPL-SCNC: 141 MMOL/L (ref 136–145)
WBC # BLD AUTO: 9.7 THOU/UL (ref 4–11)

## 2020-01-30 ENCOUNTER — HOSPITAL ENCOUNTER (INPATIENT)
Dept: HOSPITAL 35 - ER | Age: 55
LOS: 1 days | Discharge: HOME | DRG: 607 | End: 2020-01-31
Attending: HOSPITALIST | Admitting: HOSPITALIST
Payer: COMMERCIAL

## 2020-01-30 VITALS — SYSTOLIC BLOOD PRESSURE: 134 MMHG | DIASTOLIC BLOOD PRESSURE: 71 MMHG

## 2020-01-30 VITALS — DIASTOLIC BLOOD PRESSURE: 70 MMHG | SYSTOLIC BLOOD PRESSURE: 143 MMHG

## 2020-01-30 VITALS — WEIGHT: 312 LBS | BODY MASS INDEX: 44.67 KG/M2 | HEIGHT: 70 IN

## 2020-01-30 VITALS — SYSTOLIC BLOOD PRESSURE: 194 MMHG | DIASTOLIC BLOOD PRESSURE: 145 MMHG

## 2020-01-30 VITALS — SYSTOLIC BLOOD PRESSURE: 125 MMHG | DIASTOLIC BLOOD PRESSURE: 66 MMHG

## 2020-01-30 VITALS — DIASTOLIC BLOOD PRESSURE: 87 MMHG | SYSTOLIC BLOOD PRESSURE: 151 MMHG

## 2020-01-30 DIAGNOSIS — I10: ICD-10-CM

## 2020-01-30 DIAGNOSIS — E66.01: ICD-10-CM
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
## 2020-01-30 DIAGNOSIS — L30.8: ICD-10-CM

## 2020-01-30 DIAGNOSIS — I11.0: ICD-10-CM

## 2020-01-30 DIAGNOSIS — M19.90: ICD-10-CM

## 2020-01-30 DIAGNOSIS — I83.018: ICD-10-CM

## 2020-01-30 DIAGNOSIS — Z91.19: ICD-10-CM

## 2020-01-30 DIAGNOSIS — Z87.11: ICD-10-CM

## 2020-01-30 DIAGNOSIS — L97.819: ICD-10-CM

## 2020-01-30 DIAGNOSIS — E11.9: ICD-10-CM

## 2020-01-30 DIAGNOSIS — T36.8X5A: ICD-10-CM

## 2020-01-30 DIAGNOSIS — L27.0: Primary | ICD-10-CM

## 2020-01-30 DIAGNOSIS — Z88.8: ICD-10-CM

## 2020-01-30 DIAGNOSIS — I50.32: ICD-10-CM

## 2020-01-30 DIAGNOSIS — Z71.6: ICD-10-CM

## 2020-01-30 DIAGNOSIS — F17.210: ICD-10-CM

## 2020-01-30 DIAGNOSIS — N17.9: ICD-10-CM

## 2020-01-30 LAB
ANION GAP SERPL CALC-SCNC: 13 MMOL/L (ref 7–16)
BACTERIA-REFLEX: (no result) /HPF
BASOPHILS NFR BLD AUTO: 0.6 % (ref 0–2)
BILIRUB UR-MCNC: NEGATIVE MG/DL
BUN SERPL-MCNC: 40 MG/DL (ref 7–18)
CALCIUM SERPL-MCNC: 8.9 MG/DL (ref 8.5–10.1)
CHLORIDE SERPL-SCNC: 102 MMOL/L (ref 98–107)
CO2 SERPL-SCNC: 25 MMOL/L (ref 21–32)
COLOR UR: YELLOW
CREAT SERPL-MCNC: 1.9 MG/DL (ref 0.7–1.3)
EOSINOPHIL NFR BLD: 1.2 % (ref 0–3)
ERYTHROCYTE [DISTWIDTH] IN BLOOD BY AUTOMATED COUNT: 15.4 % (ref 10.5–14.5)
GLUCOSE SERPL-MCNC: 97 MG/DL (ref 74–106)
GRANULOCYTES NFR BLD MANUAL: 60.6 % (ref 36–66)
HCT VFR BLD CALC: 46.5 % (ref 42–52)
HGB BLD-MCNC: 15 GM/DL (ref 14–18)
HYALINE CASTS #/AREA URNS LPF: (no result) /LPF
KETONES UR STRIP-MCNC: NEGATIVE MG/DL
LYMPHOCYTES NFR BLD AUTO: 26.2 % (ref 24–44)
MCH RBC QN AUTO: 28 PG (ref 26–34)
MCHC RBC AUTO-ENTMCNC: 32.2 G/DL (ref 28–37)
MCV RBC: 86.9 FL (ref 80–100)
MONOCYTES NFR BLD: 11.4 % (ref 1–8)
MUCUS: (no result) STRN/LPF
NEUTROPHILS # BLD: 7.8 THOU/UL (ref 1.4–8.2)
PLATELET # BLD: 292 THOU/UL (ref 150–400)
POTASSIUM SERPL-SCNC: 3.7 MMOL/L (ref 3.5–5.1)
RBC # BLD AUTO: 5.36 MIL/UL (ref 4.5–6)
RBC # UR STRIP: (no result) /UL
RBC #/AREA URNS HPF: (no result) /HPF (ref 0–2)
SODIUM SERPL-SCNC: 140 MMOL/L (ref 136–145)
SP GR UR STRIP: >= 1.03 (ref 1–1.03)
SQUAMOUS: (no result) /LPF (ref 0–3)
URINE CLARITY: CLEAR
URINE GLUCOSE-RANDOM*: NEGATIVE
URINE LEUKOCYTES-REFLEX: NEGATIVE
URINE NITRITE-REFLEX: NEGATIVE
URINE PROTEIN (DIPSTICK): (no result)
URINE WBC-REFLEX: (no result) /HPF (ref 0–5)
UROBILINOGEN UR STRIP-ACNC: 0.2 E.U./DL (ref 0.2–1)
WBC # BLD AUTO: 12.9 THOU/UL (ref 4–11)

## 2020-01-30 PROCEDURE — 10102: CPT

## 2020-01-30 NOTE — NUR
PT ADMITTED AT 0730. A&OX4. PT IN MODERATE PAIN WHICH IS BEING CONTROLLED BY
PAIN MEDICATION. PT IS INDEPENDENT IN THE ROOM. ADMISSION COMPLEETED. IV
PATENT WITH NO REDNESS OR EDEMA. NO SCD DUE TO LOWER EXTREMITY CELLULITIS. PT
BODY IS COVERED IN WHAT LOOKS LIKE AN ALLERGIC REACTION THAT DOES NOT GO AWAY
WHEN PRESSURE IS APPLIED. BILATERAL LOWER EXTREMITIES ARE VERY DRY AND LOTION
WILL BE APPLIED HOURLY PER DR. ARCHER. PICTURES OF LEGS TAKEN. PT IS ACHS WITH
NO COVEREGE NEEDED.

## 2020-01-30 NOTE — NUR
PT ADMITTED RELATED TO CELLULITIS. CM REVIEWED CHART AND SPOKE WITH CARE TEAM.
CM MET WITH PT AT BEDSIDE THIS DAY. PT IS A&O X4. CM ROLE INTRODUCED. PT
INDICATED HE LIVES IN A HOUSE WITH HIS FIANCE AND HER GRANDFATHER. PT
INDICATED THERE ARE 3 STEPS TO ENTER AND NONE INSIDE. PT INDICATED NO HH OR OP
THERAPY PTA. PT'S PCP IS DR. MICHELLE WEAVER. PT INDICATED HE PLANS TO RETURN HOME
ONCE MEDICALLY STABLE. PT RECEPTIVE TO HH IF RECOMMENDED UPON DC. CM TO FOLLOW
AS INDICATED WITH DC PLANNING.

## 2020-01-31 VITALS — SYSTOLIC BLOOD PRESSURE: 148 MMHG | DIASTOLIC BLOOD PRESSURE: 78 MMHG

## 2020-01-31 VITALS — SYSTOLIC BLOOD PRESSURE: 128 MMHG | DIASTOLIC BLOOD PRESSURE: 70 MMHG

## 2020-01-31 LAB
ANION GAP SERPL CALC-SCNC: 8 MMOL/L (ref 7–16)
BUN SERPL-MCNC: 29 MG/DL (ref 7–18)
CALCIUM SERPL-MCNC: 8.5 MG/DL (ref 8.5–10.1)
CHLORIDE SERPL-SCNC: 104 MMOL/L (ref 98–107)
CO2 SERPL-SCNC: 25 MMOL/L (ref 21–32)
CREAT SERPL-MCNC: 1.2 MG/DL (ref 0.7–1.3)
ERYTHROCYTE [DISTWIDTH] IN BLOOD BY AUTOMATED COUNT: 15.2 % (ref 10.5–14.5)
GLUCOSE SERPL-MCNC: 138 MG/DL (ref 74–106)
HCT VFR BLD CALC: 43.3 % (ref 42–52)
HGB BLD-MCNC: 13.9 GM/DL (ref 14–18)
MCH RBC QN AUTO: 27.8 PG (ref 26–34)
MCHC RBC AUTO-ENTMCNC: 32.1 G/DL (ref 28–37)
MCV RBC: 86.7 FL (ref 80–100)
PLATELET # BLD: 250 THOU/UL (ref 150–400)
POTASSIUM SERPL-SCNC: 4.2 MMOL/L (ref 3.5–5.1)
RBC # BLD AUTO: 4.99 MIL/UL (ref 4.5–6)
SODIUM SERPL-SCNC: 137 MMOL/L (ref 136–145)
WBC # BLD AUTO: 14.1 THOU/UL (ref 4–11)

## 2020-01-31 NOTE — NUR
ASSUMED PT CARE AROUND 2330. SLEEPING IN BED. NO S/S ACUTE DISTRESS NOTED OR
REPORTED AT THIS TIME. WILL CONT TO MONITOR FOR ANY CHANGES IN CONDITION.

## 2020-01-31 NOTE — NUR
CARE TEAM INDICATED THAT PT IS MEDICALLY STABLE TO IA HOME WITH HH SERVICES
THIS DAY. ORDERS FAXED TO [facility]. PT IS AWARE AND
AGREEABLE. PT WAS PROVIDED WITH A CAB VOUCHER FOR [facility]. NO OTHER CM
INTERVENTION INDICATED. CASE CLOSED.

## 2020-01-31 NOTE — NUR
PT DENIED PAIN SO FAR.PT HAS DRYNESS ON BLE,CREAM APPLIED TO LEGS.PT CONT TO
HAVE RED SPOTS ALL OVER HIS BODY,PT STATED THAT IT IS LOOKING BETTER.NO BM
THIS SHIFT.PT RETING ON HIS BED AT THIS TIME.REPORT TO JUAN PABLO LAM WHO TOOK OVER
CARE OF PT AT 11PM.

## 2020-01-31 NOTE — NUR
PT CARE ASSUMED AT 0700. A&Ox4. AWAITING DERMATOLOGY CONSULTATION. PT IS
SLEEPING ALL DAY AND ONLY GETTING UP OUT OF THE BED WHEN HAVING TO USE THE
BATHROOM AND WHEN FOOD IS BEING BROUGHT TO THE ROOM. ACHS WITH NO COVERAGE
NEEDED THIS AM. PT IV IS PATENT WITH NO REDNESS OR SWELLING. CALL LIGHT IN
REACH. LOTION APPLIED TO PT LEGS AND EDUCATED THE PT ON THE NEED OF HIM DOING
THIS FREQUENTLY FOR THE DRYNESS ON HIS LEGS.

## 2020-07-30 ENCOUNTER — HOSPITAL ENCOUNTER (EMERGENCY)
Dept: HOSPITAL 35 - ER | Age: 55
LOS: 1 days | Discharge: HOME | End: 2020-07-31
Payer: COMMERCIAL

## 2020-07-30 VITALS — HEIGHT: 72 IN | WEIGHT: 280.01 LBS | BODY MASS INDEX: 37.93 KG/M2

## 2020-07-30 DIAGNOSIS — R06.02: ICD-10-CM

## 2020-07-30 DIAGNOSIS — R05: ICD-10-CM

## 2020-07-30 DIAGNOSIS — E11.9: ICD-10-CM

## 2020-07-30 DIAGNOSIS — Z20.828: ICD-10-CM

## 2020-07-30 DIAGNOSIS — R11.0: ICD-10-CM

## 2020-07-30 DIAGNOSIS — R35.0: ICD-10-CM

## 2020-07-30 DIAGNOSIS — Z79.899: ICD-10-CM

## 2020-07-30 DIAGNOSIS — I11.0: ICD-10-CM

## 2020-07-30 DIAGNOSIS — R23.4: ICD-10-CM

## 2020-07-30 DIAGNOSIS — Z88.8: ICD-10-CM

## 2020-07-30 DIAGNOSIS — F17.210: ICD-10-CM

## 2020-07-30 DIAGNOSIS — R53.81: ICD-10-CM

## 2020-07-30 DIAGNOSIS — R51: ICD-10-CM

## 2020-07-30 DIAGNOSIS — I50.30: ICD-10-CM

## 2020-07-30 DIAGNOSIS — R50.9: Primary | ICD-10-CM

## 2020-07-30 DIAGNOSIS — E66.01: ICD-10-CM

## 2020-07-30 DIAGNOSIS — M19.90: ICD-10-CM

## 2020-07-30 LAB
ANION GAP SERPL CALC-SCNC: 12 MMOL/L (ref 7–16)
BASOPHILS NFR BLD AUTO: 0.7 % (ref 0–2)
BE(VIVO): 1.8 MMOL/L
BUN SERPL-MCNC: 11 MG/DL (ref 7–18)
CALCIUM SERPL-MCNC: 7.9 MG/DL (ref 8.5–10.1)
CHLORIDE SERPL-SCNC: 100 MMOL/L (ref 98–107)
CO2 SERPL-SCNC: 24 MMOL/L (ref 21–32)
CREAT SERPL-MCNC: 1.4 MG/DL (ref 0.7–1.3)
EOSINOPHIL NFR BLD: 0.1 % (ref 0–3)
ERYTHROCYTE [DISTWIDTH] IN BLOOD BY AUTOMATED COUNT: 15.2 % (ref 10.5–14.5)
GLUCOSE SERPL-MCNC: 149 MG/DL (ref 74–106)
GRANULOCYTES NFR BLD MANUAL: 82.3 % (ref 36–66)
HCO3 BLD-SCNC: 23.3 MMOL/L (ref 22–26)
HCT VFR BLD CALC: 48.6 % (ref 42–52)
HGB BLD-MCNC: 16.1 GM/DL (ref 14–18)
LYMPHOCYTES NFR BLD AUTO: 7.2 % (ref 24–44)
MCH RBC QN AUTO: 28.8 PG (ref 26–34)
MCHC RBC AUTO-ENTMCNC: 33.1 G/DL (ref 28–37)
MCV RBC: 86.9 FL (ref 80–100)
MONOCYTES NFR BLD: 9.7 % (ref 1–8)
NEUTROPHILS # BLD: 12.7 THOU/UL (ref 1.4–8.2)
PCO2 BLD: 29.3 MMHG (ref 35–45)
PLATELET # BLD: 233 THOU/UL (ref 150–400)
PO2 BLD: 71.1 MMHG (ref 80–100)
POTASSIUM SERPL-SCNC: 3.6 MMOL/L (ref 3.5–5.1)
RBC # BLD AUTO: 5.59 MIL/UL (ref 4.5–6)
SODIUM SERPL-SCNC: 136 MMOL/L (ref 136–145)
WBC # BLD AUTO: 15.4 THOU/UL (ref 4–11)

## 2020-07-31 VITALS — SYSTOLIC BLOOD PRESSURE: 153 MMHG | DIASTOLIC BLOOD PRESSURE: 82 MMHG

## 2020-07-31 LAB
BACTERIA-REFLEX: (no result) /HPF
BILIRUB UR-MCNC: NEGATIVE MG/DL
COLOR UR: YELLOW
KETONES UR STRIP-MCNC: NEGATIVE MG/DL
MUCUS: (no result) STRN/LPF
RBC # UR STRIP: (no result) /UL
SP GR UR STRIP: 1.01 (ref 1–1.03)
SQUAMOUS: (no result) /LPF (ref 0–3)
URINE CLARITY: CLEAR
URINE GLUCOSE-RANDOM*: NEGATIVE
URINE LEUKOCYTES-REFLEX: NEGATIVE
URINE NITRITE-REFLEX: NEGATIVE
URINE PROTEIN (DIPSTICK): (no result)
URINE WBC-REFLEX: (no result) /HPF (ref 0–5)
UROBILINOGEN UR STRIP-ACNC: 0.2 E.U./DL (ref 0.2–1)

## 2020-08-01 ENCOUNTER — HOSPITAL ENCOUNTER (EMERGENCY)
Dept: HOSPITAL 35 - ER | Age: 55
Discharge: HOME | End: 2020-08-01
Payer: COMMERCIAL

## 2020-08-01 VITALS — WEIGHT: 300.01 LBS | HEIGHT: 70 IN | BODY MASS INDEX: 42.95 KG/M2

## 2020-08-01 VITALS — DIASTOLIC BLOOD PRESSURE: 95 MMHG | SYSTOLIC BLOOD PRESSURE: 152 MMHG

## 2020-08-01 DIAGNOSIS — L03.211: Primary | ICD-10-CM

## 2020-08-01 DIAGNOSIS — F17.210: ICD-10-CM

## 2020-08-01 DIAGNOSIS — Z79.899: ICD-10-CM

## 2020-08-01 DIAGNOSIS — I10: ICD-10-CM

## 2020-08-01 DIAGNOSIS — Z88.8: ICD-10-CM

## 2020-08-01 DIAGNOSIS — E11.9: ICD-10-CM

## 2020-08-01 LAB
ALBUMIN SERPL-MCNC: 3.2 G/DL (ref 3.4–5)
ALT SERPL-CCNC: 38 U/L (ref 30–65)
ANION GAP SERPL CALC-SCNC: 10 MMOL/L (ref 7–16)
AST SERPL-CCNC: 20 U/L (ref 15–37)
BILIRUB DIRECT SERPL-MCNC: 0.1 MG/DL
BILIRUB SERPL-MCNC: 0.3 MG/DL (ref 0.2–1)
BILIRUB UR-MCNC: NEGATIVE MG/DL
BUN SERPL-MCNC: 15 MG/DL (ref 7–18)
CALCIUM SERPL-MCNC: 8 MG/DL (ref 8.5–10.1)
CHLORIDE SERPL-SCNC: 104 MMOL/L (ref 98–107)
CO2 SERPL-SCNC: 26 MMOL/L (ref 21–32)
COLOR UR: YELLOW
CREAT SERPL-MCNC: 1.3 MG/DL (ref 0.7–1.3)
ERYTHROCYTE [DISTWIDTH] IN BLOOD BY AUTOMATED COUNT: 15.4 % (ref 10.5–14.5)
GLUCOSE SERPL-MCNC: 118 MG/DL (ref 74–106)
GRANULOCYTES NFR BLD MANUAL: 70 % (ref 36–66)
HCT VFR BLD CALC: 47.1 % (ref 42–52)
HGB BLD-MCNC: 15.7 GM/DL (ref 14–18)
KETONES UR STRIP-MCNC: NEGATIVE MG/DL
LYMPHOCYTES NFR BLD AUTO: 10 % (ref 24–44)
MCH RBC QN AUTO: 29 PG (ref 26–34)
MCHC RBC AUTO-ENTMCNC: 33.4 G/DL (ref 28–37)
MCV RBC: 86.8 FL (ref 80–100)
MONOCYTES NFR BLD: 14 % (ref 1–8)
MUCUS: (no result) STRN/LPF
NEUTROPHILS # BLD: 7.3 THOU/UL (ref 1.4–8.2)
NEUTS BAND NFR BLD: 2 % (ref 0–8)
PLATELET # BLD: 206 THOU/UL (ref 150–400)
POTASSIUM SERPL-SCNC: 3.9 MMOL/L (ref 3.5–5.1)
PROT SERPL-MCNC: 7.1 G/DL (ref 6.4–8.2)
RBC # BLD AUTO: 5.43 MIL/UL (ref 4.5–6)
RBC # UR STRIP: (no result) /UL
RBC #/AREA URNS HPF: (no result) /HPF (ref 0–2)
RBC MORPH BLD: NORMAL
SODIUM SERPL-SCNC: 140 MMOL/L (ref 136–145)
SP GR UR STRIP: 1.02 (ref 1–1.03)
SQUAMOUS: (no result) /LPF (ref 0–3)
URINE CLARITY: CLEAR
URINE GLUCOSE-RANDOM*: NEGATIVE
URINE LEUKOCYTES-REFLEX: NEGATIVE
URINE NITRITE-REFLEX: NEGATIVE
URINE PROTEIN (DIPSTICK): (no result)
URINE WBC-REFLEX: (no result) /HPF (ref 0–5)
UROBILINOGEN UR STRIP-ACNC: 1 E.U./DL (ref 0.2–1)
VARIANT LYMPHS NFR BLD MANUAL: 4 %
WBC # BLD AUTO: 10.2 THOU/UL (ref 4–11)

## 2020-08-02 NOTE — EKG
[facility]
[address] [place]   [address]                     ELECTROCARDIOGRAM REPORT      
_______________________________________________________________________________
 
Name:       FLORENTINO PERALTA             Room #:                     DEP [facility]#:      [id number]                       Account #:      [id number]  
Admission:  20    Attend Phys:                          
Discharge:  20    Date of Birth:  [birth year]  
                                                          Report #: [id number]
                                                                    [id number]
_______________________________________________________________________________
THIS REPORT FOR:  
 
cc:  MICHELLE WEAVER MD                
     [facility] - Family physician unknown
     Shoaib Gooden MD [facility]
THIS REPORT FOR:   //name//                          
 
                         [facility] ED
                                       
Test Date:    2020               Test Time:    09:06:42
Pat Name:     FLORENTINO PERALTA             Department:   
Patient ID:   [id number]            Room:          
Gender:                               Technician:   DEMETRANMEVER
:          1965               Requested By: Ramya Hdz
Order Number: [id number] MD:   Shoaib Gooden
                                 Measurements
Intervals                              Axis          
Rate:         108                      P:            40
IL:           142                      QRS:          54
QRSD:         90                       T:            60
QT:           344                                    
QTc:          461                                    
                           Interpretive Statements
Sinus tachycardia
Otherwise normal tracing
Compared to ECG 2019 16:20:34
No significant changes
Electronically Signed On 2020 10:53:52 CDT by Shoaib Gooden
[email or web address]
 
 
 
 
 
 
 
 
 
 
 
 
 
 
 
  <ELECTRONICALLY SIGNED>
   By: Shoaib Gooden MD, FACC   
  20     1053
D: 20                           _____________________________________
T: 20                           Shoaib Gooden MD, [facility]     /EPI

## 2021-04-19 ENCOUNTER — HOSPITAL ENCOUNTER (INPATIENT)
Dept: HOSPITAL 35 - ER | Age: 56
LOS: 3 days | Discharge: HOME | DRG: 603 | End: 2021-04-22
Attending: HOSPITALIST | Admitting: HOSPITALIST
Payer: COMMERCIAL

## 2021-04-19 VITALS — SYSTOLIC BLOOD PRESSURE: 162 MMHG | DIASTOLIC BLOOD PRESSURE: 81 MMHG

## 2021-04-19 VITALS — DIASTOLIC BLOOD PRESSURE: 97 MMHG | SYSTOLIC BLOOD PRESSURE: 173 MMHG

## 2021-04-19 VITALS — SYSTOLIC BLOOD PRESSURE: 119 MMHG | DIASTOLIC BLOOD PRESSURE: 80 MMHG

## 2021-04-19 VITALS — WEIGHT: 315 LBS | HEIGHT: 70 IN | BODY MASS INDEX: 45.1 KG/M2

## 2021-04-19 DIAGNOSIS — L03.116: Primary | ICD-10-CM

## 2021-04-19 DIAGNOSIS — Z88.2: ICD-10-CM

## 2021-04-19 DIAGNOSIS — L97.829: ICD-10-CM

## 2021-04-19 DIAGNOSIS — Z87.81: ICD-10-CM

## 2021-04-19 DIAGNOSIS — I50.32: ICD-10-CM

## 2021-04-19 DIAGNOSIS — Z87.11: ICD-10-CM

## 2021-04-19 DIAGNOSIS — M19.90: ICD-10-CM

## 2021-04-19 DIAGNOSIS — R53.81: ICD-10-CM

## 2021-04-19 DIAGNOSIS — I87.8: ICD-10-CM

## 2021-04-19 DIAGNOSIS — F17.210: ICD-10-CM

## 2021-04-19 DIAGNOSIS — E66.01: ICD-10-CM

## 2021-04-19 DIAGNOSIS — Z88.6: ICD-10-CM

## 2021-04-19 DIAGNOSIS — E44.1: ICD-10-CM

## 2021-04-19 DIAGNOSIS — E11.9: ICD-10-CM

## 2021-04-19 DIAGNOSIS — Z88.8: ICD-10-CM

## 2021-04-19 DIAGNOSIS — L97.819: ICD-10-CM

## 2021-04-19 DIAGNOSIS — I11.0: ICD-10-CM

## 2021-04-19 DIAGNOSIS — L03.115: ICD-10-CM

## 2021-04-19 LAB
ALBUMIN SERPL-MCNC: 3.3 G/DL (ref 3.4–5)
ALT SERPL-CCNC: 39 U/L (ref 16–63)
ANION GAP SERPL CALC-SCNC: 6 MMOL/L (ref 7–16)
AST SERPL-CCNC: 37 U/L (ref 15–37)
BASOPHILS NFR BLD AUTO: 0.7 % (ref 0–2)
BILIRUB SERPL-MCNC: 0.5 MG/DL (ref 0.2–1)
BUN SERPL-MCNC: 13 MG/DL (ref 7–18)
CALCIUM SERPL-MCNC: 8.9 MG/DL (ref 8.5–10.1)
CHLORIDE SERPL-SCNC: 103 MMOL/L (ref 98–107)
CO2 SERPL-SCNC: 27 MMOL/L (ref 21–32)
CREAT SERPL-MCNC: 1.3 MG/DL (ref 0.7–1.3)
EOSINOPHIL NFR BLD: 0.9 % (ref 0–3)
ERYTHROCYTE [DISTWIDTH] IN BLOOD BY AUTOMATED COUNT: 15.9 % (ref 10.5–14.5)
GLUCOSE SERPL-MCNC: 138 MG/DL (ref 74–106)
GRANULOCYTES NFR BLD MANUAL: 75.6 % (ref 36–66)
HCT VFR BLD CALC: 44.8 % (ref 42–52)
HGB BLD-MCNC: 15 GM/DL (ref 14–18)
LYMPHOCYTES NFR BLD AUTO: 11 % (ref 24–44)
MCH RBC QN AUTO: 29.5 PG (ref 26–34)
MCHC RBC AUTO-ENTMCNC: 33.5 G/DL (ref 28–37)
MCV RBC: 88.1 FL (ref 80–100)
MONOCYTES NFR BLD: 11.8 % (ref 1–8)
NEUTROPHILS # BLD: 7.7 THOU/UL (ref 1.4–8.2)
PLATELET # BLD: 266 THOU/UL (ref 150–400)
POTASSIUM SERPL-SCNC: 5.1 MMOL/L (ref 3.5–5.1)
PROT SERPL-MCNC: 8.1 G/DL (ref 6.4–8.2)
RBC # BLD AUTO: 5.08 MIL/UL (ref 4.5–6)
SODIUM SERPL-SCNC: 136 MMOL/L (ref 136–145)
WBC # BLD AUTO: 10.2 THOU/UL (ref 4–11)

## 2021-04-19 PROCEDURE — 10045: CPT

## 2021-04-19 PROCEDURE — 10047: CPT

## 2021-04-20 VITALS — SYSTOLIC BLOOD PRESSURE: 140 MMHG | DIASTOLIC BLOOD PRESSURE: 80 MMHG

## 2021-04-20 VITALS — SYSTOLIC BLOOD PRESSURE: 155 MMHG | DIASTOLIC BLOOD PRESSURE: 97 MMHG

## 2021-04-20 VITALS — DIASTOLIC BLOOD PRESSURE: 94 MMHG | SYSTOLIC BLOOD PRESSURE: 161 MMHG

## 2021-04-20 VITALS — DIASTOLIC BLOOD PRESSURE: 68 MMHG | SYSTOLIC BLOOD PRESSURE: 126 MMHG

## 2021-04-20 LAB
EST. AVERAGE GLUCOSE BLD GHB EST-MCNC: 148 MG/DL
GLYCOHEMOGLOBIN (HGB A1C): 6.8 % (ref 4.8–5.6)

## 2021-04-20 NOTE — NUR
ASSUMED PT CARE THIS AM. PT A&OX4. PATIENT TO WEAR OXYGEN WHEN SLEEPING, BUT
FREQUENTLY TAKES THE OXYGEN OFF. REINFORCED THE IMPORTANCE OF WEARING OXYGEN
WHEN SLEEPING. BLOOD SUGARS BEING MONITORED. IV BECAME INFILTRATED, AWAITING
NEW IV TO BE PLACED. BLE WOUNDS DRESSED AS ORDERED. PATIENT TOLERATED THIS
WELL. GAVE TYLENOL FOR PAIN THIS AM. TAKES MEDS WITHOUT ISSUE. ABLE TO MAKE
ALL NEEDS KNOWN. PATIENT AMBULATORY TO THE BATHROOM.

## 2021-04-20 NOTE — NUR
PT ADMITTED RELATED TO CELLULITIS. CM REVIEWED CHART AND SPOKE WITH CARE TEAM.
CM MET WITH PT AT BEDSIDE THIS DAY. PT APPEARED TO BE A&O X4. CM ROLE
INTRODUCED. PT INDICTED HE LIVES IN A HOUSE WITH HIS WIFE, KIDS, AND HIS
WIFE'S GRANDFATHER. HE INDICATED HE HAD BEEN INDPEDNENT WITH GAIT AND ADLS
PTA. HE HAS NOC O2 AT 2L THROUGH [facility] [place] AND HAD HH IN THE PAST. PT INDICATED
HE PLANS TO RETURN HOME WITH HH ONCE MEDICALLY STABLE. CM FOLLOWING REGARDING
DC PLANNING. PT ON IV VANC AND WOUND CARE IS CONSULTED. POSSIBLE DC HOME
TOMORROW.

## 2021-04-20 NOTE — NUR
ASSUMED CARE OF PT FROM ED AT 1950HRS. PT IS AOX4 AND LETS NEEDS BE KNOWN. PT
IS UP AD MONIQUE. PT WAS ORIENTED TO THE UNIT AND HIS ROOM. PT WAS ABLE TO ANSWER
ALL ADMISSION RELATED QUESTIONS. BLE CELLULITIS. WOUND PICS TAKEN. ASSESSMENT
CHARTED. PT RAN ST ON TELE. ORDERS RECEIVED AND STARTED. PT WAS ABLE TO GET
COMFORTABLE AND SLEEP PART OF THE SHIFT. VSS AND NO S/S OF ACUTE DISTRESS.
WILL CONTINUE TO MONITOR.

## 2021-04-21 VITALS — SYSTOLIC BLOOD PRESSURE: 141 MMHG | DIASTOLIC BLOOD PRESSURE: 87 MMHG

## 2021-04-21 VITALS — SYSTOLIC BLOOD PRESSURE: 130 MMHG | DIASTOLIC BLOOD PRESSURE: 50 MMHG

## 2021-04-21 VITALS — DIASTOLIC BLOOD PRESSURE: 85 MMHG | SYSTOLIC BLOOD PRESSURE: 152 MMHG

## 2021-04-21 NOTE — NUR
PT ALERT AND ORIENTED TIMES FOUR. VSS. PT DENIES PAIN/SOA. PT TOLERATES MEDS
AND MEALS. BLE DRESSING CHANGED THIS SHIFT. PT UP AB MONIQUE WITH STEADY GAIT. PT
PROGRESSING TOWRADS POC GOALS.

## 2021-04-21 NOTE — NUR
Discharge Summary     PATIENT ID: Edvin Riggs  MRN: [id number]   YOB: 1927    DATE OF ADMISSION: 2/9/2021  5:36 PM    DATE OF DISCHARGE: 2/16/2021    PRIMARY CARE PROVIDER: Koko Tracey MD       ATTENDING PHYSICIAN: Isa Duane, MD  DISCHARGING PROVIDER: Gino Valera MD     To contact this individual call [phone] and ask the  to page. If unavailable ask to be transferred the Adult Hospitalist Department. CONSULTATIONS: IP CONSULT TO HOSPITALIST    PROCEDURES/SURGERIES: Procedure(s):  RIGHT HIP IM  KIRA INSERTION    ADMITTING DIAGNOSES & HOSPITAL COURSE:   From H&P 02/09/2021:   Peggy.Car[age] y.o lady with dementia, HTN, hyperlipidemia, who presents after a ground-level fall. She is a poor historian and doesn't recall what happened exactly. She reports right hip pain with any movement of her right leg. She offers no other complaints and is pain-free if she lays still. \"     Closed right femoral intertrochanteric fracture, traumatic due to fall  -Underwent fixation of the right intertrochanteric femoral fracture  -Use tylenol for pain,she did not require any strong pain medication for days. Tramadol for breakthrough   -Continue with physical therapy.  -Already on apixaban. History of PE:  -Continue apixaban     Dementia: without behavioral disturbances: Patient remained pleasantly and cooperative for the most part,albeit confused. HTN:   -SBP now in the 150s,resume losartan      Hyperlipidemia  - Not on home meds    UTI Abnormal UA. Received abx clinically,ucx came back negative but was collected after initiation of abx.     Mild drop in HB due to acute perioperative blood loss                PENDING TEST RESULTS:   At the time of discharge the following test results are still pending: none    FOLLOW UP APPOINTMENTS:    Follow-up Information    None          ADDITIONAL CARE RECOMMENDATIONS:     DIET: Regular Diet and Cardiac Diet      OXYGEN / BiPAP SETTINGS: 2 L/min ORDERS RECEIVED FOR EVAL AND TREAT.  SPOKE TO Pt WHO STATES HE HAS ALREADY
BEEN UP WITHOUT DIFFICULTY AND IS HAVING NO PROBLEMS WITH HIS MOBILITY.  Pt
DECLINING P.T. EVAL BUT SOUNDS LIKE IT IS NOT NEEDED.  WILL BE AVAILABLE TO
ASSESS IF ANY PROCEDURES OR TESTS IMPAIR HIS MOBILITY.  WILL AWAIT NEW ORDERS
IF NEEDED. via nasal canula. ACTIVITY: Activity as tolerated and PT/OT Eval and Treat      EQUIPMENT needed: TBD      DISCHARGE MEDICATIONS:   See Medication Reconciliation Form      NOTIFY YOUR PHYSICIAN FOR ANY OF THE FOLLOWING:   Fever over 101 degrees for 24 hours. Chest pain, shortness of breath, fever, chills, nausea, vomiting, diarrhea, change in mentation, falling, weakness, bleeding. Severe pain or pain not relieved by medications. Or, any other signs or symptoms that you may have questions about. DISPOSITION:    Home With:   OT  PT  HH  RN      x SNF/Inpatient Rehab/LTAC    Independent/assisted living    Hospice    Other:       PATIENT CONDITION AT DISCHARGE:     Functional status    Poor    x Deconditioned     Independent      Cognition     Lucid     Forgetful    x Dementia      Catheters/lines (plus indication)    Carey     PICC     PEG    x None      Code status     Full code    x DNR      PHYSICAL EXAMINATION AT DISCHARGE:  General:  Alert, cooperative, no distress, confused which is baseline. Lungs:   Clear to auscultation bilaterally. Heart:  Regular rate and rhythm, S1, S2 normal, no murmur, click, rub or gallop. Abdomen:   Soft, non-tender, non-distended. Bowel sounds normal.    Extremities: Extremities normal, no cyanosis or edema. Right humeral surgical wound bandaged. Skin: Skin color, texture, turgor normal. No rashes or lesions   Neurologic:  Alert but confused. CNII-XII grossly intact. Oriented to self only,person           CHRONIC MEDICAL DIAGNOSES:  Problem List as of 2/16/2021 Date Reviewed: 12/21/2018          Codes Class Noted - Resolved    Closed hip fracture ([facility] [address] ICD-10-CM: Y88.840M  ICD-9-CM: 820.8  2/9/2021 - Present        Fall ICD-10-CM: W19. Ali Smoker  ICD-9-CM: E888.9  8/23/2018 - Present        Unwitnessed fall ICD-10-CM: R29.6  ICD-9-CM: Nayla Dunawayley  8/22/2018 - Present        Hypercholesteremia ICD-10-CM: E78.00  ICD-9-CM: 272.0  5/27/2010 - Present    Overview Signed 5/27/2010 12:56 PM by Suleman Show     1980's             Osteoporosis ICD-10-CM: M81.0  ICD-9-CM: 733.00  5/27/2010 - Present        IBS (irritable bowel syndrome) ICD-10-CM: K58.9  ICD-9-CM: 564.1  5/27/2010 - Present        OA (osteoarthritis) ICD-10-CM: M19.90  ICD-9-CM: 715.90  5/27/2010 - Present        RESOLVED: Syncope ICD-10-CM: R55  ICD-9-CM: 780.2  12/15/2018 - 12/21/2018        RESOLVED: UTI (urinary tract infection) ICD-10-CM: N39.0  ICD-9-CM: 599.0  12/15/2018 - 12/21/2018        RESOLVED: KOLBY (acute kidney injury) ([facility] [address] ICD-10-CM: N17.9  ICD-9-CM: 584.9  12/15/2018 - 12/21/2018        RESOLVED: Hypoxia ICD-10-CM: R09.02  ICD-9-CM: 799.02  8/22/2018 - 12/21/2018                DISCHARGE MEDICATIONS:  Current Discharge Medication List      CONTINUE these medications which have NOT CHANGED    Details   nystatin (MYCOSTATIN) powder Apply  to affected area four (4) times daily. magnesium hydroxide ([place]rera Milk of Magnesia) 400 mg/5 mL suspension Take 30 mL by mouth daily as needed for Constipation. apixaban (Eliquis) 5 mg tablet Take 5 mg by mouth two (2) times a day. Indications: a clot in the lung      guaiFENesin ER (MUCINEX) 600 mg ER tablet Take 600 mg by mouth two (2) times daily as needed for Congestion. !! acetaminophen (TYLENOL) 500 mg tablet Take 500 mg by mouth two (2) times daily as needed for Pain. !! acetaminophen (TYLENOL) 325 mg tablet Take 650 mg by mouth two (2) times a day. calcium-vitamin D (OS-LUCÍA 500+D) 500 mg(1,250mg) -200 unit per tablet Take 1 Tab by mouth daily (with breakfast). multivitamin (ONE A DAY) tablet Take 1 Tab by mouth daily. losartan (COZAAR) 50 mg tablet Take 50 mg by mouth daily. memantine (NAMENDA) 10 mg tablet Take 10 mg by mouth two (2) times a day. !! - Potential duplicate medications found. Please discuss with provider.         Greater than 30 minutes were spent with the patient on counseling and coordination of care    Signed:    Zackary Arias MD  2/16/2021  12:45 PM

## 2021-04-22 VITALS — SYSTOLIC BLOOD PRESSURE: 147 MMHG | DIASTOLIC BLOOD PRESSURE: 64 MMHG

## 2021-04-22 LAB
ANION GAP SERPL CALC-SCNC: 8 MMOL/L (ref 7–16)
BUN SERPL-MCNC: 16 MG/DL (ref 7–18)
CALCIUM SERPL-MCNC: 8.6 MG/DL (ref 8.5–10.1)
CHLORIDE SERPL-SCNC: 102 MMOL/L (ref 98–107)
CO2 SERPL-SCNC: 29 MMOL/L (ref 21–32)
CREAT SERPL-MCNC: 1.3 MG/DL (ref 0.7–1.3)
ERYTHROCYTE [DISTWIDTH] IN BLOOD BY AUTOMATED COUNT: 15.7 % (ref 10.5–14.5)
GLUCOSE SERPL-MCNC: 120 MG/DL (ref 74–106)
HCT VFR BLD CALC: 45.8 % (ref 42–52)
HGB BLD-MCNC: 15 GM/DL (ref 14–18)
MCH RBC QN AUTO: 29 PG (ref 26–34)
MCHC RBC AUTO-ENTMCNC: 32.7 G/DL (ref 28–37)
MCV RBC: 88.6 FL (ref 80–100)
PLATELET # BLD: 245 THOU/UL (ref 150–400)
POTASSIUM SERPL-SCNC: 4.1 MMOL/L (ref 3.5–5.1)
RBC # BLD AUTO: 5.17 MIL/UL (ref 4.5–6)
SODIUM SERPL-SCNC: 139 MMOL/L (ref 136–145)
WBC # BLD AUTO: 7 THOU/UL (ref 4–11)

## 2021-04-22 NOTE — HC
[facility]
[address] [place]   [address]                     CONSULTATION                  
_______________________________________________________________________________
 
Name:       FLORENTINO PERALTA MIGUEL ANGEL             Room #:         454-P       [facility] [place]  
M.R.#:      [id number]                       Account #:      [id number]  
Admission:  04/19/21    Attend Phys:    Adam Aaron MD 
Discharge:              Date of Birth:  [birth year]  
                                                          Report #: [id number]
                                                                    [id number]   
_______________________________________________________________________________
THIS REPORT FOR:  
 
cc:  MICHELLE WEAVER                   
     Physician not on staff                                               
     Eric Lee MD                                        ~
 
DATE OF SERVICE:  04/20/2021
 
 
CHIEF COMPLAINT:  Venous ulcers and cellulitis, both lower extremities. 
 
HISTORY OF PRESENT ILLNESS:  This is a 55-year-old male patient with whom I am
familiar from previous hospitalization, who has a history of recurrent venous
ulcers and cellulitis.  He developed increased pain, swelling in his legs and
was admitted for intravenous antibiotic therapy.  I have been asked to see him
with regard to wound care.  The patient states that the ulcerations are quite a
bit worse this time. 
 
PAST MEDICAL HISTORY:  Positive for type 2 diabetes mellitus, obesity,
pancreatitis, history of recurring cellulitis to his lower extremities, history
of diastolic heart failure. 
 
MEDICATIONS:  Include amlodipine, furosemide, metformin. 
 
ALLERGIES:  SULFA AND IBUPROFEN. 
 
SOCIAL HISTORY:  The patient does smoke cigarettes.  No alcohol use. 
 
FAMILY HISTORY:  Noncontributory. 
 
REVIEW OF SYSTEMS: 
CONSTITUTIONAL:  The patient denies fever, chills or weight loss. 
NEUROLOGICAL:  The patient denies focal weakness, numbness, or tingling. 
EYES:  The patient denies visual changes, redness, or drainage. 
ENT:  The patient denies earache, nasal drainage, or sore throat. 
CARDIOVASCULAR:  The patient denies chest pain, palpitations or diaphoresis. 
PULMONARY:  The patient denies cough or shortness of breath. 
GASTROINTESTINAL:  The patient denies nausea, vomiting, diarrhea or abdominal
pain. 
ORTHOPEDIC:  The patient denies pain, swelling and ulceration, lower
extremities. 
Other systems in a 14-point review of systems are negative. 
 
PHYSICAL EXAMINATION: 
VITAL SIGNS:  At this time include temperature 36.1, pulse 107, respiratory rate
21, blood pressure 155/97. 
 
 
 
[address]   [address]                     CONSULTATION                  
_______________________________________________________________________________
 
Name:       FLORENTINO PERALTA [facility]             Room #:         454-[facility] [place]  
.R.#:      [id number]                       Account #:      [id number]  
Admission:  04/19/21    Attend Phys:    Adam Aaron MD 
Discharge:              Date of Birth:  [birth year]  
                                                          Report #: [id number]
                                                                    [id number]   
_______________________________________________________________________________
 
GENERAL:  This is a somewhat chronically ill-appearing male patient who appears
to be in no distress. 
HEENT:  Head normocephalic.  Nose and throat are clear. 
NECK:  Supple. 
ABDOMEN:  Soft.  Bowel sounds present. 
EXTREMITIES:  Lower extremities demonstrate cellulitic changes to bilateral
lower extremities with venous ulcerations to both calves.  There is moderate
fibrin present with moderate tenderness noted. 
NEUROLOGIC:  The patient is alert, oriented, appropriate. 
 
LABORATORY STUDIES:  Include white blood cell count 10.2 with a hemoglobin of
15.0.  Sodium 136, potassium 5.1, chloride 103, CO2 of 27, BUN 13, creatinine
1.3, albumin is 3.3. 
 
CLINICAL IMPRESSION: 
1.  Cellulitis, bilateral lower extremities. 
2.  Venous ulcerations, bilateral lower extremities. 
3.  Type 2 diabetes mellitus and morbid obesity. 
4.  Mild protein-calorie malnutrition. 
5.  Generalized debility. 
 
RECOMMENDATIONS:  At this point in time, we will recommend Silvadene, morphine
cream, Xeroform, ABD, Kerlix and Ace bilaterally.  Elevation of the lower
extremities.  Intravenous antibiotic therapy.  Eventually, I think additional
compression could be employed.  I appreciate being asked to see him in
consultation.
 
 
 
 
 
 
 
 
 
 
 
 
 
 
 
 
 
  <ELECTRONICALLY SIGNED>
   By: Eric Lee MD        
  04/22/21     1005
D: 04/20/21 1208                           _____________________________________
T: 04/20/21 1218                           Eric Lee MD          /nt

## 2021-04-22 NOTE — NUR
ASSUMED PT CARE THIS AM. PT VSS, A&OX4. PATIENT ABLE TO MAKE NEEDS KNOWN.
PATIENT HAS A FLAT AFFECT, WHEN NURSE ASKS WHAT IS WRONG, PATIENT STATES HE
WANTS TO KNOW THE PLAN FOR DISCHARTGING AND IS READY TO GO HOME. IV PATENT,
ANTIBIOTICS INFUSED. PATIENT REMAINS CONTINENT AND AMBULATES AROUND ROOM
INDEPENDENTLY. TOOK MEDS WITHOUT ISSUE THIS AM. ON ROOM AIR. REPORTS PAIN AT A
6, WHEN ASKED IF THE PATIENT WANTED ANY MEDICATION FOR PAIN, THE PATIENT
DENIED. REPORTING NO NUMBNESS OR TINGLING.

## 2021-08-26 ENCOUNTER — HOSPITAL ENCOUNTER (INPATIENT)
Dept: HOSPITAL 35 - ER | Age: 56
LOS: 4 days | Discharge: HOME | DRG: 603 | End: 2021-08-30
Attending: INTERNAL MEDICINE | Admitting: INTERNAL MEDICINE
Payer: COMMERCIAL

## 2021-08-26 VITALS — DIASTOLIC BLOOD PRESSURE: 78 MMHG | SYSTOLIC BLOOD PRESSURE: 157 MMHG

## 2021-08-26 VITALS — DIASTOLIC BLOOD PRESSURE: 101 MMHG | SYSTOLIC BLOOD PRESSURE: 169 MMHG

## 2021-08-26 VITALS — SYSTOLIC BLOOD PRESSURE: 162 MMHG | DIASTOLIC BLOOD PRESSURE: 107 MMHG

## 2021-08-26 VITALS — HEIGHT: 70 IN | WEIGHT: 315 LBS | BODY MASS INDEX: 45.1 KG/M2

## 2021-08-26 DIAGNOSIS — N17.9: ICD-10-CM

## 2021-08-26 DIAGNOSIS — Z90.49: ICD-10-CM

## 2021-08-26 DIAGNOSIS — Z87.11: ICD-10-CM

## 2021-08-26 DIAGNOSIS — M19.90: ICD-10-CM

## 2021-08-26 DIAGNOSIS — I12.9: ICD-10-CM

## 2021-08-26 DIAGNOSIS — Z99.81: ICD-10-CM

## 2021-08-26 DIAGNOSIS — F17.210: ICD-10-CM

## 2021-08-26 DIAGNOSIS — Z88.6: ICD-10-CM

## 2021-08-26 DIAGNOSIS — Z88.2: ICD-10-CM

## 2021-08-26 DIAGNOSIS — S80.921A: ICD-10-CM

## 2021-08-26 DIAGNOSIS — Z88.1: ICD-10-CM

## 2021-08-26 DIAGNOSIS — E11.22: ICD-10-CM

## 2021-08-26 DIAGNOSIS — Z79.899: ICD-10-CM

## 2021-08-26 DIAGNOSIS — I87.8: ICD-10-CM

## 2021-08-26 DIAGNOSIS — N18.30: ICD-10-CM

## 2021-08-26 DIAGNOSIS — L03.116: ICD-10-CM

## 2021-08-26 DIAGNOSIS — R60.0: ICD-10-CM

## 2021-08-26 DIAGNOSIS — Z20.822: ICD-10-CM

## 2021-08-26 DIAGNOSIS — E66.01: ICD-10-CM

## 2021-08-26 DIAGNOSIS — L03.115: Primary | ICD-10-CM

## 2021-08-26 DIAGNOSIS — G47.33: ICD-10-CM

## 2021-08-26 LAB
ALBUMIN SERPL-MCNC: 3.1 G/DL (ref 3.4–5)
ALT SERPL-CCNC: 70 U/L (ref 30–65)
ANION GAP SERPL CALC-SCNC: 9 MMOL/L (ref 7–16)
AST SERPL-CCNC: 35 U/L (ref 15–37)
BILIRUB SERPL-MCNC: 0.3 MG/DL (ref 0.2–1)
BUN SERPL-MCNC: 28 MG/DL (ref 7–18)
CALCIUM SERPL-MCNC: 8.8 MG/DL (ref 8.5–10.1)
CHLORIDE SERPL-SCNC: 104 MMOL/L (ref 98–107)
CO2 SERPL-SCNC: 28 MMOL/L (ref 21–32)
CREAT SERPL-MCNC: 2 MG/DL (ref 0.7–1.3)
ERYTHROCYTE [DISTWIDTH] IN BLOOD BY AUTOMATED COUNT: 17 % (ref 10.5–14.5)
GLUCOSE SERPL-MCNC: 131 MG/DL (ref 74–106)
HCT VFR BLD CALC: 44.8 % (ref 42–52)
HGB BLD-MCNC: 14.4 GM/DL (ref 14–18)
MCH RBC QN AUTO: 28.5 PG (ref 26–34)
MCHC RBC AUTO-ENTMCNC: 32.3 G/DL (ref 28–37)
MCV RBC: 88.3 FL (ref 80–100)
PLATELET # BLD: 251 THOU/UL (ref 150–400)
POTASSIUM SERPL-SCNC: 4.1 MMOL/L (ref 3.5–5.1)
PROT SERPL-MCNC: 7.8 G/DL (ref 6.4–8.2)
RBC # BLD AUTO: 5.07 MIL/UL (ref 4.5–6)
SODIUM SERPL-SCNC: 141 MMOL/L (ref 136–145)
WBC # BLD AUTO: 8.9 THOU/UL (ref 4–11)

## 2021-08-26 PROCEDURE — 27000 TENOTOMY ADDUCTOR HIP PERQ: CPT

## 2021-08-26 PROCEDURE — 10040 EXTRACTION: CPT

## 2021-08-26 NOTE — NUR
Pt. arrived to the unit at 1830 from the emergency room.  He is currently
resting in bed and c/o bilateral lower leg pain and itching.  Tania DE LA O
called and notified.  See new orders in CPOE.  Admission assessment and
history is completed.  Pt. is up ad christina.

## 2021-08-27 VITALS — DIASTOLIC BLOOD PRESSURE: 72 MMHG | SYSTOLIC BLOOD PRESSURE: 138 MMHG

## 2021-08-27 VITALS — SYSTOLIC BLOOD PRESSURE: 153 MMHG | DIASTOLIC BLOOD PRESSURE: 98 MMHG

## 2021-08-27 LAB
ANION GAP SERPL CALC-SCNC: 10 MMOL/L (ref 7–16)
BUN SERPL-MCNC: 25 MG/DL (ref 7–18)
CALCIUM SERPL-MCNC: 8.6 MG/DL (ref 8.5–10.1)
CHLORIDE SERPL-SCNC: 104 MMOL/L (ref 98–107)
CO2 SERPL-SCNC: 25 MMOL/L (ref 21–32)
CREAT SERPL-MCNC: 1.7 MG/DL (ref 0.7–1.3)
ERYTHROCYTE [DISTWIDTH] IN BLOOD BY AUTOMATED COUNT: 16.9 % (ref 10.5–14.5)
GLUCOSE SERPL-MCNC: 121 MG/DL (ref 74–106)
HCT VFR BLD CALC: 44.3 % (ref 42–52)
HGB BLD-MCNC: 14.5 GM/DL (ref 14–18)
MCH RBC QN AUTO: 29.1 PG (ref 26–34)
MCHC RBC AUTO-ENTMCNC: 32.8 G/DL (ref 28–37)
MCV RBC: 88.5 FL (ref 80–100)
PLATELET # BLD: 202 THOU/UL (ref 150–400)
POTASSIUM SERPL-SCNC: 4.7 MMOL/L (ref 3.5–5.1)
RBC # BLD AUTO: 5 MIL/UL (ref 4.5–6)
SODIUM SERPL-SCNC: 139 MMOL/L (ref 136–145)
WBC # BLD AUTO: 8.2 THOU/UL (ref 4–11)

## 2021-08-27 NOTE — NUR
PT ADMITTED RELATED TO CELLILITIS AND TITA. CM REVIEWED CHART AND SPOKE WITH
CARE TEAM. CM MET WITH PT AT BEDSIDE THIS DAY. PT INDICATED HE LIVES IN A
HOUSE WITH HIS SPOUSE AND KID. HE INDICATED THAT THERE ARE THREE STEPS TO
ENTER AND NO STEPS INSIDE. PT INDICATED HE HAD BEEN INDEPDNENT WITH GAIT AND
ADLS PTA. PT INDICATED HE HAS NOC O2 A 2L THROUGH [facility]. HE INDICATED HE
PLANS TO RETURN HOME ONCE MEDICALLY STABLE. PT'S PCP IS MICHELLE OLMOS. PT
IS ON IV CEFAZOLIN. CM FOLLOWING REGARDING DC PLANNING.

## 2021-08-27 NOTE — NUR
Assumed pt care at 7am.Pt in bed most of the time today with foot of bed
elevated.Assessment completed.vss.Pt tolerated med and diet.Dr Harris and Akin
here,order noted.Received call from lab about pt blood culture done
yestaerday.Dr Harris notified and order received.Will continue to monitor.

## 2021-08-27 NOTE — NUR
Extreme class III obesity with BMI 48.8. Admit with cellulitis, venous stasis
disease, chronic lower extremity edema.  Hx DM, CKD, + tobacco use.  Wt gain
22 lb x 9 mo and 35 lb over 2 yrs.  BG currently controlled, last A1C 6.8 in
4/2021. Tolerates meals.  Will add breanne bid for wounds.  Encourage lifestyle
changes, possibly would benefit from bariatric surgical consultation.  Low
nutrition risk otherwise.

## 2021-08-27 NOTE — NUR
Pt. rested quietly during the night when checked on during frequent rounds.
No further c/o pain or itching.

## 2021-08-28 VITALS — SYSTOLIC BLOOD PRESSURE: 161 MMHG | DIASTOLIC BLOOD PRESSURE: 80 MMHG

## 2021-08-28 VITALS — SYSTOLIC BLOOD PRESSURE: 176 MMHG | DIASTOLIC BLOOD PRESSURE: 97 MMHG

## 2021-08-28 VITALS — DIASTOLIC BLOOD PRESSURE: 90 MMHG | SYSTOLIC BLOOD PRESSURE: 137 MMHG

## 2021-08-28 LAB
ANION GAP SERPL CALC-SCNC: 8 MMOL/L (ref 7–16)
BUN SERPL-MCNC: 20 MG/DL (ref 7–18)
CALCIUM SERPL-MCNC: 8.6 MG/DL (ref 8.5–10.1)
CHLORIDE SERPL-SCNC: 105 MMOL/L (ref 98–107)
CO2 SERPL-SCNC: 27 MMOL/L (ref 21–32)
CREAT SERPL-MCNC: 1.5 MG/DL (ref 0.7–1.3)
GLUCOSE SERPL-MCNC: 121 MG/DL (ref 74–106)
POTASSIUM SERPL-SCNC: 4.5 MMOL/L (ref 3.5–5.1)
SODIUM SERPL-SCNC: 140 MMOL/L (ref 136–145)

## 2021-08-28 PROCEDURE — 05HY33Z INSERTION OF INFUSION DEVICE INTO UPPER VEIN, PERCUTANEOUS APPROACH: ICD-10-PCS | Performed by: INTERNAL MEDICINE

## 2021-08-28 NOTE — NUR
Pt. rested quietly during the night when checked on during frequent rounds.
Requesting something stronger for pain and itching.  (see cpoe).  Meds given
(see emar) with relief noted.

## 2021-08-28 NOTE — NUR
Assumed pt care at 7am.Pt in bed most of the time today sleeping on and off.
Assessment completed.Vss.But bp slightly elevated.Dr Harris and Blu here,new
order noted.Piv infiltrated and iv team replaced it with midline.Pt c/o
itching prior to iv antibiotic,benadryl po given with relief.Pt took shower
with pca setup. No verbal c/o at present,will continue to monitor.

## 2021-08-28 NOTE — NUR
PATIENT HAS HAD MULTIPLE IV STICKS AND INFILTRATED PIV LINES. DISCUSSED
MIDLINE PLACEMENT AND HE AGREED TO RISKS OF DVT AND INFECTION AS WELL AS
BENIFITS. THE LEFT CEPHALIC VEIN WAS WIDLEY PATENT. A #4F POWER MIDLINE WAS
PLACED. THE 20CM LINE ADVANCED WITHOUT DIFFICULTY. THE LINE WAS SECURED AND
RELEASED FOR USE

## 2021-08-29 VITALS — SYSTOLIC BLOOD PRESSURE: 156 MMHG | DIASTOLIC BLOOD PRESSURE: 97 MMHG

## 2021-08-29 VITALS — SYSTOLIC BLOOD PRESSURE: 146 MMHG | DIASTOLIC BLOOD PRESSURE: 94 MMHG

## 2021-08-29 VITALS — DIASTOLIC BLOOD PRESSURE: 75 MMHG | SYSTOLIC BLOOD PRESSURE: 137 MMHG

## 2021-08-29 NOTE — NUR
Assumed pt care at 7am.Pt in bed resting and waiting for breakfast.Assessment
completed.vss but elevated bp noted.Oral bp meds given other am meds with
relief.Pt tolerated meds and diet.Dr Hurt and Kimberly here,order noted.Pt took
shower later this afternoon.Pt will possible dc home am.Will continue to
monitor.

## 2021-08-29 NOTE — NUR
Pt. rested quietly during the night when checked on during frequent rounds.
Pain meds given for leg pain and itching (see emar) with relief.  Bilateral
legs elevated at all times.

## 2021-08-30 VITALS — DIASTOLIC BLOOD PRESSURE: 97 MMHG | SYSTOLIC BLOOD PRESSURE: 156 MMHG

## 2021-08-30 NOTE — NUR
Pt. rested quietly during the night when checked on during frequent rounds.
Meds given for bilateral leg pain and itching (see emar) with some relief
noted.

## 2021-08-30 NOTE — NUR
CARE TEAM INDICATED THAT PT IS MEDICALLY STABLE TO DC HOME THIS DAY. PT IS TO
DC HOME TO SELF CARE. CM MET WITH PT AT BEDSIDE THIS DAY. HE IS AWARE OF DC.
PT INDICATED HE HAS TRANSPORT HOME THIS DAY. NO OTHER CM INTERVENTION
INDICATED. CASE CLOSED.

## 2021-08-30 NOTE — NUR
ASSUMED PT CARE THIS AM. PT A&OX4, ABLE TO MAKE NEEDS KNOWN. PATIENT REPORTS
PAIN IN HIS BILATERAL LOWER EXTREMETIES, RESPONDS WELL TO PAIN MEDICATION
GIVEN. PATIENT REPORTING NO NUMBNESS OR TINGLING. PATIENT IS ON ROOM AIR. IV
PATENT, SALINE LOCKED. MEDICATIONS TAKEN WITHOUT ISSUE THIS AM. CALL LIGHT
WITHIN REACH.

## 2021-09-22 ENCOUNTER — HOSPITAL ENCOUNTER (INPATIENT)
Dept: HOSPITAL 35 - ER | Age: 56
LOS: 5 days | Discharge: HOME | DRG: 177 | End: 2021-09-27
Attending: INTERNAL MEDICINE | Admitting: INTERNAL MEDICINE
Payer: COMMERCIAL

## 2021-09-22 VITALS — WEIGHT: 311 LBS | HEIGHT: 70 IN | BODY MASS INDEX: 44.52 KG/M2

## 2021-09-22 VITALS — DIASTOLIC BLOOD PRESSURE: 108 MMHG | SYSTOLIC BLOOD PRESSURE: 151 MMHG

## 2021-09-22 DIAGNOSIS — I12.9: ICD-10-CM

## 2021-09-22 DIAGNOSIS — J12.82: ICD-10-CM

## 2021-09-22 DIAGNOSIS — U07.1: Primary | ICD-10-CM

## 2021-09-22 DIAGNOSIS — E11.22: ICD-10-CM

## 2021-09-22 DIAGNOSIS — J96.01: ICD-10-CM

## 2021-09-22 DIAGNOSIS — Z88.8: ICD-10-CM

## 2021-09-22 DIAGNOSIS — N18.2: ICD-10-CM

## 2021-09-22 LAB
ANION GAP SERPL CALC-SCNC: 14 MMOL/L (ref 7–16)
BASOPHILS NFR BLD AUTO: 1 % (ref 0–2)
BUN SERPL-MCNC: 20 MG/DL (ref 7–18)
CALCIUM SERPL-MCNC: 8.9 MG/DL (ref 8.5–10.1)
CHLORIDE SERPL-SCNC: 105 MMOL/L (ref 98–107)
CHOLEST SERPL-MCNC: 209 MG/DL (ref ?–200)
CO2 SERPL-SCNC: 21 MMOL/L (ref 21–32)
CREAT SERPL-MCNC: 1.5 MG/DL (ref 0.7–1.3)
EOSINOPHIL NFR BLD: 1.3 % (ref 0–3)
ERYTHROCYTE [DISTWIDTH] IN BLOOD BY AUTOMATED COUNT: 15.6 % (ref 10.5–14.5)
GLUCOSE SERPL-MCNC: 119 MG/DL (ref 74–106)
GRANULOCYTES NFR BLD MANUAL: 66.5 % (ref 36–66)
HCT VFR BLD CALC: 49.3 % (ref 42–52)
HDLC SERPL-MCNC: 28 MG/DL (ref 40–?)
HGB BLD-MCNC: 16.1 GM/DL (ref 14–18)
LDLC SERPL-MCNC: 117 MG/DL (ref ?–100)
LYMPHOCYTES NFR BLD AUTO: 20.5 % (ref 24–44)
MCH RBC QN AUTO: 27.8 PG (ref 26–34)
MCHC RBC AUTO-ENTMCNC: 32.5 G/DL (ref 28–37)
MCV RBC: 85.3 FL (ref 80–100)
MONOCYTES NFR BLD: 10.7 % (ref 1–8)
NEUTROPHILS # BLD: 6.5 THOU/UL (ref 1.4–8.2)
PLATELET # BLD: 470 THOU/UL (ref 150–400)
POTASSIUM SERPL-SCNC: 3.9 MMOL/L (ref 3.5–5.1)
RBC # BLD AUTO: 5.78 MIL/UL (ref 4.5–6)
SODIUM SERPL-SCNC: 140 MMOL/L (ref 136–145)
TC:HDL: 7.5 RATIO
TRIGL SERPL-MCNC: 320 MG/DL (ref ?–150)
VLDLC SERPL CALC-MCNC: 64 MG/DL (ref ?–40)
WBC # BLD AUTO: 9.7 THOU/UL (ref 4–11)

## 2021-09-22 PROCEDURE — XW033E5 INTRODUCTION OF REMDESIVIR ANTI-INFECTIVE INTO PERIPHERAL VEIN, PERCUTANEOUS APPROACH, NEW TECHNOLOGY GROUP 5: ICD-10-PCS | Performed by: INTERNAL MEDICINE

## 2021-09-22 PROCEDURE — 10879: CPT

## 2021-09-22 NOTE — NUR
PT C/O HEADACHE AND REQUESTED MEDICATION TO HELP HIM SLEEP. NOTIFIED GILBERTO WHEAT NP. ORDERS RECEIVED.

## 2021-09-23 VITALS — SYSTOLIC BLOOD PRESSURE: 114 MMHG | DIASTOLIC BLOOD PRESSURE: 75 MMHG

## 2021-09-23 VITALS — DIASTOLIC BLOOD PRESSURE: 103 MMHG | SYSTOLIC BLOOD PRESSURE: 174 MMHG

## 2021-09-23 VITALS — SYSTOLIC BLOOD PRESSURE: 168 MMHG | DIASTOLIC BLOOD PRESSURE: 100 MMHG

## 2021-09-23 VITALS — SYSTOLIC BLOOD PRESSURE: 142 MMHG | DIASTOLIC BLOOD PRESSURE: 76 MMHG

## 2021-09-23 VITALS — SYSTOLIC BLOOD PRESSURE: 144 MMHG | DIASTOLIC BLOOD PRESSURE: 91 MMHG

## 2021-09-23 VITALS — SYSTOLIC BLOOD PRESSURE: 174 MMHG | DIASTOLIC BLOOD PRESSURE: 115 MMHG

## 2021-09-23 VITALS — SYSTOLIC BLOOD PRESSURE: 159 MMHG | DIASTOLIC BLOOD PRESSURE: 109 MMHG

## 2021-09-23 LAB
ALBUMIN SERPL-MCNC: 3.1 G/DL (ref 3.4–5)
ALT SERPL-CCNC: 101 U/L (ref 30–65)
ANION GAP SERPL CALC-SCNC: 14 MMOL/L (ref 7–16)
AST SERPL-CCNC: 58 U/L (ref 15–37)
BACTERIA #/AREA URNS HPF: (no result) /HPF
BILIRUB SERPL-MCNC: 0.3 MG/DL (ref 0.2–1)
BILIRUB UR-MCNC: NEGATIVE MG/DL
BUN SERPL-MCNC: 23 MG/DL (ref 7–18)
CALCIUM SERPL-MCNC: 8.2 MG/DL (ref 8.5–10.1)
CHLORIDE SERPL-SCNC: 107 MMOL/L (ref 98–107)
CO2 SERPL-SCNC: 23 MMOL/L (ref 21–32)
COLOR UR: YELLOW
CREAT SERPL-MCNC: 1.8 MG/DL (ref 0.7–1.3)
ERYTHROCYTE [DISTWIDTH] IN BLOOD BY AUTOMATED COUNT: 15.5 % (ref 10.5–14.5)
GLUCOSE SERPL-MCNC: 136 MG/DL (ref 74–106)
HCT VFR BLD CALC: 44.9 % (ref 42–52)
HGB BLD-MCNC: 15.1 GM/DL (ref 14–18)
KETONES UR STRIP-MCNC: NEGATIVE MG/DL
MCH RBC QN AUTO: 28.8 PG (ref 26–34)
MCHC RBC AUTO-ENTMCNC: 33.7 G/DL (ref 28–37)
MCV RBC: 85.4 FL (ref 80–100)
NITRITE UR QL STRIP: NEGATIVE
PLATELET # BLD: 417 THOU/UL (ref 150–400)
POTASSIUM SERPL-SCNC: 4.6 MMOL/L (ref 3.5–5.1)
PROT SERPL-MCNC: 8.1 G/DL (ref 6.4–8.2)
RBC # BLD AUTO: 5.26 MIL/UL (ref 4.5–6)
RBC # UR STRIP: NEGATIVE /UL
SODIUM SERPL-SCNC: 144 MMOL/L (ref 136–145)
SP GR UR STRIP: 1.02 (ref 1–1.03)
SQUAMOUS: (no result) /LPF (ref 0–3)
URINE CLARITY: CLEAR
URINE GLUCOSE-RANDOM*: NEGATIVE
URINE LEUKOCYTES: NEGATIVE
URINE PROTEIN (DIPSTICK): (no result)
UROBILINOGEN UR STRIP-ACNC: 0.2 E.U./DL (ref 0.2–1)
WBC # BLD AUTO: 7.7 THOU/UL (ref 4–11)
WBC #/AREA URNS HPF: (no result) /HPF

## 2021-09-23 NOTE — NUR
INITIAL ASSESSMENT:
SW reviewed chart and spoke with nursing and attending physician. Pt was
admitted from home due to COVID. Pt placed in Enhanced Isolation. Pt has not
received a COVID vaccination. Pt is afebrile and on 2L of O2. Pt is on IV abx,
IV steroids and Remdesivir. SW spoke with pt via phone. Introduced role of SW.
Pt is alert/orientated x 4. Pt reports he lives at home with his wife and
family. Prior to admission, pt was independent with ADLs. No use of DME for
ambulation. [facility]. No hx of 
services or post-acute placement. Pt's PCP is Dr. Ronald Higgins at the [facility] in [place]. Plan is for pt to discharge home when medically
stable. SW is following to assist as needed with discharge planning.

## 2021-09-23 NOTE — NUR
RN ADMITTED FROM ER ABOUT 0800AM, PT IS COVID POSITIVE AT 09/22/21, PT IS
A&OX4, PT IS ON O2 3-4L/MIN/NC , PT'S O2SAT STAYS AT 93-96%, PT'S VS ARE
STABLE, PT HAS STARTED IV ABX AND TREAT COVID MEDICATIONS, PT DENIES PAIN AT
DAY SHIFT.

## 2021-09-23 NOTE — NUR
PT SLEEPING AT THIS TIME. BP IMPROVED WITH SCHEDULED MEDICATION. HEADACHE
RESOLVED. REMDESIVIR GIVEN. IVF INFUSING.

## 2021-09-23 NOTE — 2DMMODE
[facility]
[address]
[place]   [address]                   2 D/M-MODE ECHOCARDIOGRAM     
_______________________________________________________________________________
 
Name:       FLORENTINO PERALTA MIGUEL ANGEL             Room #:         353-P       ADM IN  
M.R.#:      [id number]                       Account #:      [id number]  
Admission:  09/22/21    Attend Phys:    Celia Harris MD   
Discharge:              Date of Birth:  [birth year]  
                                                          Report #: [id number]
                                                                    [id number]
_______________________________________________________________________________
THIS REPORT FOR:  
 
cc:  MICHELLE WEAVER MD                
     Physician not on staff        
     Shoaib Gooden MD [facility]                                        
                                                                       ~
 
--------------- APPROVED REPORT --------------
 
 
Study performed:  09/23/2021 10:30:45
 
EXAM: Comprehensive 2D, Doppler, and color-flow 
Echocardiogram 
Patient Location: Bedside   
Room #:  353     Status:  routine
 
      BSA:         2.62
HR: 78 bpm BP:          174/103 mmHg 
Rhythm: NSR     
 
Other Information 
Study Quality: Poor
Technically limited study due to  body 
habitus.
 
Indications
Diabetes
Dyspnea 
Elevated Troponin
Hypertension/HDD
 
2D Dimensions
IVSd:  11.43 (7-11mm)  LVOT Diam:  25.07 (18-24mm) 
LVDd:  56.72 mm   
PWd:  10.85 (7-11mm)  Ascending Ao:  28.55 (22-36mm)
LVDs:  45.36 (25-40mm)  
Left Atrium:  41.08 (27-40mm) 
Aortic Root:  30.81 mm  
 
Aortic Valve
AoV Peak Jordin.:  1.09 m/s 
AO Peak Gr.:  4.79 mmHg  
 
Left Ventricle
Left ventricle is at the upper limits of normal. Regional wall motion 
 
 
[facility]
[address]
[place]  [address]
Phone:  [phone]                    2 D/M-MODE ECHOCARDIOGRAM     
_______________________________________________________________________________
 
Name:            FLORENTINO PERALTA MIGUEL ANGEL             Room #:        353-P       ADM IN
M.R.#:           [id number]          Account #:     [id number]  
Admission:       09/22/21         Attend Phys:   INO Kline
Discharge:                  Date of Birth: [birth year]  
                         Report #:      [id number]
        [id number]
_______________________________________________________________________________
is not well visualized but grossly normal. There is normal left 
ventricular wall thickness. Left ventricular systolic function at the 
lower limits of normal LVEF is 50%. Mild diastolic dysfunction
 
Right Ventricle
The right ventricle is normal size. The right ventricular systolic 
function is normal.
 
Atria
The left atrium size is normal. The right atrium size is 
normal.
 
Aortic Valve
The aortic valve is normal in structure. No aortic regurgitation is 
present. There is no aortic valvular stenosis.
 
Mitral Valve
The mitral valve is normal in structure. Trace mitral regurgitation. 
No evidence of mitral valve stenosis.
 
Tricuspid Valve
The tricuspid valve is normal in structure. There is no tricuspid 
valve regurgitation noted.
 
Pulmonic Valve
The pulmonary valve is normal in structure. There is no pulmonic 
valvular regurgitation.
 
Great Vessels
The aortic root is normal in size. IVC is normal in size and 
collapses >50% with inspiration.
 
Pericardium
There is no pericardial 
effusion.
 
<Conclusion>
Limited/abbreviated study. Difficult imaging windows
Left ventricular systolic function at the lower limits of normal
LVEF is 50%.
Mild diastolic dysfunction
The aortic valve is normal in structure. No aortic regurgitation or 
stenosis.
The mitral valve is normal in structure. Trace mitral 
 
 
[facility]
[address]
[place]  [address]
Phone:  [phone] 2 D/M-MODE ECHOCARDIOGRAM     
_______________________________________________________________________________
 
Name:            FLORENTINO PERALTA MIGUEL ANGEL             Room #:        353-P       ADM IN
M.R.#:           [id number]          Account #:     [id number]  
Admission:       09/22/21         Attend Phys:   INO Kline
Discharge:                  Date of Birth: [birth year]  
                         Report #:      [id number]
        [id number]
_______________________________________________________________________________
regurgitation.
There is no pericardial effusion.
 
 
 
 
 
 
 
 
 
 
 
 
 
 
 
 
 
 
 
 
 
 
 
 
 
 
 
 
 
 
 
 
 
 
 
 
 
 
 
 
 
 
  <ELECTRONICALLY SIGNED>
   By: Shoaib Gooden MD, FACC   
  09/23/21     1226
D: 09/23/21 1226                           _____________________________________
T: 09/23/21 1226                           Shoaib Gooden MD, FACC     /INF

## 2021-09-23 NOTE — NUR
0521 - PT TEARFUL, C/O POUNDING HEADACHE. BP ELEVATED. NOTIFIED GILBERTO WHEAT NP FOR HOSPITALIST. PER NP, GIVE PO TYLENOL AND MORNING DOSE
OF NORVASC EVEN THOUGH PT IS NPO AFTER MIDNIGHT. MEDICATIONS GIVEN. WILL
MONITOR FURTHER.

## 2021-09-23 NOTE — EKG
[place] [email or web address]
[place]  [address]
Phone:  [phone]                    ELECTROCARDIOGRAM REPORT      
_______________________________________________________________________________
 
Name:       FLORENTINO PERALTAN             Room #:         353-P       ADM IN  
M.R.#:      [id number]     Account #:      [id number]  
Admission:  21    Attend Phys:    Celia Harris MD   
Discharge:              Date of Birth:  [birth year]  
                                                          Report #: [id number]
   [id number]
_______________________________________________________________________________
                         [facility] ED
                                       
Test Date:    2021               Test Time:    18:14:19
Pat Name:     FLORENTINO PERALTA             Department:   
Patient ID:   [id number]            Room:         [facility]
Gender:       M                        Technician:   NEIL
:          1965               Requested By: Jack Townsend
Order Number: [id number] MD:   Shoaib Gooden
                                 Measurements
Intervals                              Axis          
Rate:         119                      P:            40
MA:           143                      QRS:          44
QRSD:         89                       T:            30
QT:           335                                    
QTc:          472                                    
                           Interpretive Statements
Sinus tachycardia
Otherwise no significant abnormality
Compared to ECG 2020 09:06:42
No significant changes
Electronically Signed On 2021 8:03:55 CDT by Shoaib Gooden
[email or web address]
 
 
 
 
 
 
 
 
 
 
 
 
 
 
 
 
 
 
 
 
 
  <ELECTRONICALLY SIGNED>
   By: Shoaib Gooden MD, [facility]   
  21     0803
D: 21 181                           _____________________________________
T: 21 1814                           Shoaib Gooden MD, FACC     /EPI

## 2021-09-24 VITALS — DIASTOLIC BLOOD PRESSURE: 80 MMHG | SYSTOLIC BLOOD PRESSURE: 146 MMHG

## 2021-09-24 VITALS — SYSTOLIC BLOOD PRESSURE: 133 MMHG | DIASTOLIC BLOOD PRESSURE: 90 MMHG

## 2021-09-24 VITALS — SYSTOLIC BLOOD PRESSURE: 141 MMHG | DIASTOLIC BLOOD PRESSURE: 92 MMHG

## 2021-09-24 VITALS — DIASTOLIC BLOOD PRESSURE: 80 MMHG | SYSTOLIC BLOOD PRESSURE: 130 MMHG

## 2021-09-24 VITALS — SYSTOLIC BLOOD PRESSURE: 149 MMHG | DIASTOLIC BLOOD PRESSURE: 92 MMHG

## 2021-09-24 LAB
ALBUMIN SERPL-MCNC: 2.9 G/DL (ref 3.4–5)
ALT SERPL-CCNC: 71 U/L (ref 30–65)
ANION GAP SERPL CALC-SCNC: 12 MMOL/L (ref 7–16)
AST SERPL-CCNC: 34 U/L (ref 15–37)
BASOPHILS NFR BLD AUTO: 0.7 % (ref 0–2)
BILIRUB SERPL-MCNC: 0.2 MG/DL (ref 0.2–1)
BUN SERPL-MCNC: 31 MG/DL (ref 7–18)
CALCIUM SERPL-MCNC: 8 MG/DL (ref 8.5–10.1)
CHLORIDE SERPL-SCNC: 107 MMOL/L (ref 98–107)
CO2 SERPL-SCNC: 23 MMOL/L (ref 21–32)
CREAT SERPL-MCNC: 1.5 MG/DL (ref 0.7–1.3)
EOSINOPHIL NFR BLD: 0 % (ref 0–3)
ERYTHROCYTE [DISTWIDTH] IN BLOOD BY AUTOMATED COUNT: 16 % (ref 10.5–14.5)
EST. AVERAGE GLUCOSE BLD GHB EST-MCNC: 157 MG/DL
GLUCOSE SERPL-MCNC: 122 MG/DL (ref 74–106)
GLYCOHEMOGLOBIN (HGB A1C): 7.1 % (ref 4.8–5.6)
GRANULOCYTES NFR BLD MANUAL: 79.9 % (ref 36–66)
HCT VFR BLD CALC: 43.1 % (ref 42–52)
HGB BLD-MCNC: 14 GM/DL (ref 14–18)
LYMPHOCYTES NFR BLD AUTO: 9.7 % (ref 24–44)
MCH RBC QN AUTO: 28 PG (ref 26–34)
MCHC RBC AUTO-ENTMCNC: 32.6 G/DL (ref 28–37)
MCV RBC: 86.1 FL (ref 80–100)
MONOCYTES NFR BLD: 9.7 % (ref 1–8)
NEUTROPHILS # BLD: 10.3 THOU/UL (ref 1.4–8.2)
PLATELET # BLD: 356 THOU/UL (ref 150–400)
POTASSIUM SERPL-SCNC: 4.2 MMOL/L (ref 3.5–5.1)
PROT SERPL-MCNC: 7.3 G/DL (ref 6.4–8.2)
RBC # BLD AUTO: 5 MIL/UL (ref 4.5–6)
SODIUM SERPL-SCNC: 142 MMOL/L (ref 136–145)
WBC # BLD AUTO: 12.9 THOU/UL (ref 4–11)

## 2021-09-24 NOTE — NUR
SW reviewed chart and spoke with nursing and attending physician. Pt remains
in Enhanced Isolation due to COVID. Pt is afebrile and on 2L of O2. Pt is on
IV abx, IV steroids and Remdesivir. No weekend discharge planned. Pt may
benefit from PT/OT evals prior to discharge. Pt has nocturnal O2 in place at
home. Will need a rest/exercise oximetry to determine home O2 needs. SANDIP is
following to assist as needed with discharge planning.

## 2021-09-24 NOTE — NUR
PT RESTING IN BED. O2 PER NC REPORTED NOSE DRY AND RESPIRATORY PROVIDED
HUMIDIFIER. LUNGS DIMINSHED, ANDRES SKIN TONE. PT REPORTING FATIGUE AND
HEADACHE AND COUGH, PRNS PROVIDED. PT REPORTS PASSING GAS, BS DECREASED. PT
PROVIDED HS SNACK. PT TALKED ABOUT HIS FIANCEE THAT HAS LUPUS AND COVID, BUT
THAT SHE HAS RECOVERED FASTER THAN HIM.

## 2021-09-24 NOTE — HC
[facility]
[address]
[place]   [address]                     CONSULTATION                  
_______________________________________________________________________________
 
Name:       FLORENTINO PERALTA MIGUEL ANGEL             Room #:         353-P       [facility] [place]  
M.R.#:      [id number]                       Account #:      [id number]  
Admission:  09/22/21    Attend Phys:    Celia Harris MD   
Discharge:              Date of Birth:  [birth year]  
                                                          Report #: [id number]
                                                                    [id number] 
_______________________________________________________________________________
THIS REPORT FOR:  
 
cc:  MICHELLE WEAVER MD                
     Physician not on staff                                               
     Steve Hurt MD                                           ~
 
DATE OF SERVICE: 09/23/2021
 
INFECTIOUS DISEASE CONSULTATION
 
ATTENDING PHYSICIAN:  Dr. Harris.
 
REASON FOR EVALUATION:  COVID-19 infection, complicated by pneumonitis, 
respiratory failure.
 
HISTORY OF PRESENT SUBJECTIVE:  Chart reviewed.  The patient examined.  This 
55-year-old gentleman who has significant medical history including obstructive 
sleep apnea requiring supplemental oxygen at night, also diabetes mellitus and 
hypertension, who believes he was exposed to COVID roughly three weeks ago, had 
developed increasing dyspnea over the last several days with associated cough.  
He experienced some chills, although no fevers, poor p.o. intake and anorexia 
and some generalized myalgias.  He was encouraged to come to the ER.  Initial 
chest x-ray showed no acute process; however, the CT did show some parenchymal 
changes consistent with ground glass in COVID.  He was confirmed to have a 
positive COVID test.  There is no evidence of PE.  Lactic acid was 1.7.  D-dimer
of 0.51.  Procalcitonin less than 0.05.  He was noted to be hypoxemic.  
Currently maintained on supplemental oxygen 2 L per nasal cannula.  He was 
empirically started on combination therapy including dexamethasone, remdesivir, 
azithromycin and ceftriaxone.
 
ALLERGIES:  IBUPROFEN AND BACTRIM.
 
CURRENT MEDICATIONS:  Include famotidine, metoprolol, amlodipine, nicotine, 
aspirin, cholecalciferol, zinc, ascorbic acid, dexamethasone, enoxaparin, 
ceftriaxone, guaifenesin, insulin sliding scale, ____, albuterol, azithromycin, 
remdesivir.
 
PAST MEDICAL HISTORY:  As described above, diabetes mellitus, history of 
hypertension, chronic renal insufficiency, pancreatitis, peptic ulcer disease, 
DJD, osteoarthritis.
 
SOCIAL HISTORY:  Current every day smoker.  No illicit drug use.  No ethanol.
 
FAMILY HISTORY:  Noncontributory.
 
REVIEW OF SYSTEMS:  Otherwise, unremarkable with the exception of the above.
 
 
 
[facility]
[address] [place]   [address]                     CONSULTATION                  
_______________________________________________________________________________
 
Name:       FLORENTINO PERALTA MIGUEL ANGEL             Room #:         353-P       [facility] [place]  
..#:      [id number]                       Account #:      [id number]  
Admission:  09/22/21    Attend Phys:    Celia Harris MD   
Discharge:              Date of Birth:  [birth year]  
                                                          Report #: [id number]
                                                                    [id number] 
_______________________________________________________________________________
 
 
PHYSICAL EXAMINATION:
GENERAL:  He is lying in a left lateral decubitus position, mild to moderate 
distress, appears somewhat chronically ill.  He is obese, generally lucid.
VITAL SIGNS:  Temperature 97.7, pulse 85, respirations 18, blood pressure 
159/109.
SKIN:  Warm, dry.
HEENT:  Normocephalic.  Extraocular muscles intact.  Nasal cannula in place.
NECK:  Supple.
LUNGS:  Few scattered coarse breath sounds.
HEART:  Regular.  I do not appreciate a murmur.
ABDOMEN:  Obese, distended, firm, nontender.
EXTREMITIES:  No cyanosis.
GENITOURINARY AND RECTAL:  Deferred.
 
LABORATORY DATA:  Troponin elevated at ____.  Echo, EF of 50%, no evidence of 
valvular disease.  Urinalysis, 1-5 white cells.  Blood cultures collected at 
time of admission are sterile thus far.  Electrolytes from this morning, sodium 
144, potassium 4.6, chloride 107, bicarbonate is 23, anion gap of 14, BUN and 
creatinine 23 and 1.8, glucose of 136, AST of 58, ALT of 101.  Albumin 3.1, 
total protein of 8.1.  CBC:  White count of 7.7, H and H 15.1 and 44.9, 
platelets of 417.  CT chest as noted above.  TSH 1.568.
 
ASSESSMENT AND PLAN:  COVID-19 infection complicated by pneumonitis, respiratory
failure secondary to diabetes mellitus, obstructive sleep apnea, hypertension.  
We will continue current approach as prescribed with directed therapy against 
coronavirus with dexamethasone, remdesivir, vitamins in addition to empiric 
therapy with ceftriaxone and azithromycin.  Remains quite tenuous and raises 
question of probable additional complications and perhaps worsening 
deteriorating clinical status.  Continue oxygen therapy as prescribed.  ____ is 
following elevated troponin.
 
 
 
 
 
 
 
 
 
 
 
 
  <ELECTRONICALLY SIGNED>
   By: Steve Hurt MD           
  09/24/21     0826
D: 09/23/21 1256                           _____________________________________
T: 09/23/21 1349                           Steve Hurt MD             /nt

## 2021-09-24 NOTE — NUR
Pt assess for high risk screen on admit. Pt admitted with covid+, elevated
troponin. Hx RLE cellulitis, BLE edmea, DMII, CKDIII, pancreatitis. Admitted
frequently recently and is showing fluctuations in documented weights: CBW
304#, Aug 2021 340#, April 2021 357#. If weights are accurate, he is showing
15% loss since April, and 10% loss in a month. These are significant losses,
if accurate, but could be contributed to fluid. Unable to reach pt via phone x
3 this date for weight hx. Intakes have been good on all past admissions. This
admission, intake fair thus far and likely deminished r/t s/s covid and SOA.
Will add Glucerna daily for possible sig wt loss. Place at low nutrition risk
with interventions initiated.

## 2021-09-24 NOTE — NUR
PT MAKING SLOW PROGRESS TOWARDS GOALS.  ON O2 AT 6L PER NC OVERNIGHT.  HAS
MILD SOA WITH TALKING.  DOES REPORT HE FEELS SOA WITH ACTIVITY SUCH USING THE
BATHROOM.  LUNGS DIMINISHED THROUGHOUT.  OCCASIONAL HARSH COUGH NOTED BUT
DENIED ANY PRODUCTIVE SPUTUM.

## 2021-09-25 VITALS — SYSTOLIC BLOOD PRESSURE: 146 MMHG | DIASTOLIC BLOOD PRESSURE: 82 MMHG

## 2021-09-25 VITALS — SYSTOLIC BLOOD PRESSURE: 155 MMHG | DIASTOLIC BLOOD PRESSURE: 74 MMHG

## 2021-09-25 VITALS — SYSTOLIC BLOOD PRESSURE: 127 MMHG | DIASTOLIC BLOOD PRESSURE: 82 MMHG

## 2021-09-25 VITALS — SYSTOLIC BLOOD PRESSURE: 139 MMHG | DIASTOLIC BLOOD PRESSURE: 84 MMHG

## 2021-09-25 VITALS — SYSTOLIC BLOOD PRESSURE: 150 MMHG | DIASTOLIC BLOOD PRESSURE: 92 MMHG

## 2021-09-25 LAB
ALBUMIN SERPL-MCNC: 2.9 G/DL (ref 3.4–5)
ALT SERPL-CCNC: 80 U/L (ref 30–65)
ANION GAP SERPL CALC-SCNC: 10 MMOL/L (ref 7–16)
AST SERPL-CCNC: 42 U/L (ref 15–37)
BILIRUB SERPL-MCNC: 0.2 MG/DL (ref 0.2–1)
BUN SERPL-MCNC: 29 MG/DL (ref 7–18)
CALCIUM SERPL-MCNC: 8.2 MG/DL (ref 8.5–10.1)
CHLORIDE SERPL-SCNC: 108 MMOL/L (ref 98–107)
CO2 SERPL-SCNC: 25 MMOL/L (ref 21–32)
CREAT SERPL-MCNC: 1.4 MG/DL (ref 0.7–1.3)
GLUCOSE SERPL-MCNC: 95 MG/DL (ref 74–106)
POTASSIUM SERPL-SCNC: 4.4 MMOL/L (ref 3.5–5.1)
PROT SERPL-MCNC: 7.1 G/DL (ref 6.4–8.2)
SODIUM SERPL-SCNC: 143 MMOL/L (ref 136–145)

## 2021-09-25 NOTE — NUR
PT RESTING IN BED. REPORTED PAIN MED RELIEVED HEADACHE. IVF INTACT. LUNGS
DIMINISHED. OBESE, REDDENED SKIN TONE. O2 PER NC. PT PROVIDED PRN FOR SLEEP,
PT STATED HIS GOAL WAS TO SLEEP TONIGHT.

## 2021-09-26 VITALS — DIASTOLIC BLOOD PRESSURE: 92 MMHG | SYSTOLIC BLOOD PRESSURE: 136 MMHG

## 2021-09-26 VITALS — DIASTOLIC BLOOD PRESSURE: 69 MMHG | SYSTOLIC BLOOD PRESSURE: 132 MMHG

## 2021-09-26 VITALS — SYSTOLIC BLOOD PRESSURE: 149 MMHG | DIASTOLIC BLOOD PRESSURE: 94 MMHG

## 2021-09-26 VITALS — DIASTOLIC BLOOD PRESSURE: 98 MMHG | SYSTOLIC BLOOD PRESSURE: 151 MMHG

## 2021-09-26 VITALS — SYSTOLIC BLOOD PRESSURE: 129 MMHG | DIASTOLIC BLOOD PRESSURE: 70 MMHG

## 2021-09-26 LAB
ALBUMIN SERPL-MCNC: 2.9 G/DL (ref 3.4–5)
ALT SERPL-CCNC: 75 U/L (ref 30–65)
ANION GAP SERPL CALC-SCNC: 8 MMOL/L (ref 7–16)
AST SERPL-CCNC: 35 U/L (ref 15–37)
BILIRUB SERPL-MCNC: 0.2 MG/DL (ref 0.2–1)
BUN SERPL-MCNC: 27 MG/DL (ref 7–18)
CALCIUM SERPL-MCNC: 8.2 MG/DL (ref 8.5–10.1)
CHLORIDE SERPL-SCNC: 107 MMOL/L (ref 98–107)
CO2 SERPL-SCNC: 27 MMOL/L (ref 21–32)
CREAT SERPL-MCNC: 1.2 MG/DL (ref 0.7–1.3)
GLUCOSE SERPL-MCNC: 100 MG/DL (ref 74–106)
POTASSIUM SERPL-SCNC: 4.3 MMOL/L (ref 3.5–5.1)
PROT SERPL-MCNC: 6.8 G/DL (ref 6.4–8.2)
SODIUM SERPL-SCNC: 142 MMOL/L (ref 136–145)

## 2021-09-27 VITALS — SYSTOLIC BLOOD PRESSURE: 154 MMHG | DIASTOLIC BLOOD PRESSURE: 90 MMHG

## 2021-09-27 VITALS — SYSTOLIC BLOOD PRESSURE: 161 MMHG | DIASTOLIC BLOOD PRESSURE: 103 MMHG

## 2021-09-27 VITALS — DIASTOLIC BLOOD PRESSURE: 90 MMHG | SYSTOLIC BLOOD PRESSURE: 154 MMHG

## 2021-09-27 NOTE — NUR
DISCHARGE NOTE:
SANDIP reviewed chart and spoke with nursing and attending physician. Pt is
medically stable to discharge home today. Rest/exercise oximetry to be
completed prior to discharge. Pt test done on 9/26 and did not qualify for
home O2. SW spoke with pt via phone to provide update and discuss discharge
planning. Pt verbalized understanding of possible need for home O2. Pt is
already established with [facility] for nocturnal O2. Will arrange with [facility]
if pt need daytime O2. Pt states his family will be able to provide
transportation home. No additional SW needs identified at this time. SW is
following to assist should needs arise.

## 2021-09-27 NOTE — NUR
PT MAKING PROGRESS TOWARDS GOALS.  PT ON ROOM AIR THROUGHOUT THE NIGHT.  PT
ABLE TO AMBULATE TO TOILET AND REPORTS MILD SOA WITH MOVEMENT.  STATES COUGH
HAS LESSENED IN SEVERITY.  HOPES TO GO HOME TODAY.

## 2021-12-29 ENCOUNTER — HOSPITAL ENCOUNTER (INPATIENT)
Dept: HOSPITAL 35 - ER | Age: 56
LOS: 6 days | Discharge: HOME | DRG: 291 | End: 2022-01-04
Attending: HOSPITALIST | Admitting: HOSPITALIST
Payer: COMMERCIAL

## 2021-12-29 VITALS — HEIGHT: 70 IN | WEIGHT: 308 LBS | BODY MASS INDEX: 44.09 KG/M2

## 2021-12-29 VITALS — DIASTOLIC BLOOD PRESSURE: 108 MMHG | SYSTOLIC BLOOD PRESSURE: 154 MMHG

## 2021-12-29 VITALS — DIASTOLIC BLOOD PRESSURE: 117 MMHG | SYSTOLIC BLOOD PRESSURE: 180 MMHG

## 2021-12-29 DIAGNOSIS — M19.90: ICD-10-CM

## 2021-12-29 DIAGNOSIS — N17.9: ICD-10-CM

## 2021-12-29 DIAGNOSIS — Z88.6: ICD-10-CM

## 2021-12-29 DIAGNOSIS — E11.22: ICD-10-CM

## 2021-12-29 DIAGNOSIS — N18.2: ICD-10-CM

## 2021-12-29 DIAGNOSIS — Z83.3: ICD-10-CM

## 2021-12-29 DIAGNOSIS — E66.01: ICD-10-CM

## 2021-12-29 DIAGNOSIS — Z87.11: ICD-10-CM

## 2021-12-29 DIAGNOSIS — Z82.49: ICD-10-CM

## 2021-12-29 DIAGNOSIS — I13.0: Primary | ICD-10-CM

## 2021-12-29 DIAGNOSIS — Z91.14: ICD-10-CM

## 2021-12-29 DIAGNOSIS — K27.9: ICD-10-CM

## 2021-12-29 DIAGNOSIS — Z88.2: ICD-10-CM

## 2021-12-29 DIAGNOSIS — I50.30: ICD-10-CM

## 2021-12-29 DIAGNOSIS — J96.21: ICD-10-CM

## 2021-12-29 DIAGNOSIS — J44.9: ICD-10-CM

## 2021-12-29 DIAGNOSIS — Z71.6: ICD-10-CM

## 2021-12-29 DIAGNOSIS — F17.210: ICD-10-CM

## 2021-12-29 DIAGNOSIS — G47.33: ICD-10-CM

## 2021-12-29 LAB
ALBUMIN SERPL-MCNC: 3.2 G/DL (ref 3.4–5)
ALT SERPL-CCNC: 30 U/L (ref 16–63)
ANION GAP SERPL CALC-SCNC: 8 MMOL/L (ref 7–16)
APTT BLD: 28.5 SECONDS (ref 24.5–32.8)
AST SERPL-CCNC: 23 U/L (ref 15–37)
BASOPHILS NFR BLD AUTO: 0.9 % (ref 0–2)
BE(VIVO): 0.8 MMOL/L
BILIRUB SERPL-MCNC: 0.6 MG/DL (ref 0.2–1)
BUN SERPL-MCNC: 11 MG/DL (ref 7–18)
CALCIUM SERPL-MCNC: 8.8 MG/DL (ref 8.5–10.1)
CHLORIDE SERPL-SCNC: 106 MMOL/L (ref 98–107)
CO2 SERPL-SCNC: 27 MMOL/L (ref 21–32)
CREAT SERPL-MCNC: 1.1 MG/DL (ref 0.7–1.3)
D DIMER PPP FEU-MCNC: 1.01 UG/MLFEU (ref 0.19–0.5)
EOSINOPHIL NFR BLD: 0.6 % (ref 0–3)
ERYTHROCYTE [DISTWIDTH] IN BLOOD BY AUTOMATED COUNT: 16.7 % (ref 10.5–14.5)
GLUCOSE SERPL-MCNC: 112 MG/DL (ref 74–106)
GRANULOCYTES NFR BLD MANUAL: 77.7 % (ref 36–66)
HCO3 BLD-SCNC: 23.9 MMOL/L (ref 22–26)
HCT VFR BLD CALC: 47.2 % (ref 42–52)
HGB BLD-MCNC: 15.2 GM/DL (ref 14–18)
INR PPP: 0.97
LYMPHOCYTES NFR BLD AUTO: 11.9 % (ref 24–44)
MAGNESIUM SERPL-MCNC: 1.8 MG/DL (ref 1.8–2.4)
MCH RBC QN AUTO: 28.5 PG (ref 26–34)
MCHC RBC AUTO-ENTMCNC: 32.2 G/DL (ref 28–37)
MCV RBC: 88.4 FL (ref 80–100)
MONOCYTES NFR BLD: 8.9 % (ref 1–8)
NEUTROPHILS # BLD: 8.5 THOU/UL (ref 1.4–8.2)
PCO2 BLD: 34.1 MMHG (ref 35–45)
PLATELET # BLD: 252 THOU/UL (ref 150–400)
PO2 BLD: 58.2 MMHG (ref 80–100)
POTASSIUM SERPL-SCNC: 4.2 MMOL/L (ref 3.5–5.1)
PROT SERPL-MCNC: 7.6 G/DL (ref 6.4–8.2)
PROTHROMBIN TIME: 10.6 SECONDS (ref 10.5–12.1)
RBC # BLD AUTO: 5.34 MIL/UL (ref 4.5–6)
SODIUM SERPL-SCNC: 141 MMOL/L (ref 136–145)
WBC # BLD AUTO: 10.9 THOU/UL (ref 4–11)

## 2021-12-29 PROCEDURE — 10040 EXTRACTION: CPT

## 2021-12-29 PROCEDURE — 10081 I&D PILONIDAL CYST COMP: CPT

## 2021-12-30 VITALS — DIASTOLIC BLOOD PRESSURE: 79 MMHG | SYSTOLIC BLOOD PRESSURE: 128 MMHG

## 2021-12-30 VITALS — SYSTOLIC BLOOD PRESSURE: 115 MMHG | DIASTOLIC BLOOD PRESSURE: 67 MMHG

## 2021-12-30 VITALS — SYSTOLIC BLOOD PRESSURE: 147 MMHG | DIASTOLIC BLOOD PRESSURE: 77 MMHG

## 2021-12-30 LAB
ANION GAP SERPL CALC-SCNC: 11 MMOL/L (ref 7–16)
BUN SERPL-MCNC: 19 MG/DL (ref 7–18)
CALCIUM SERPL-MCNC: 8.5 MG/DL (ref 8.5–10.1)
CHLORIDE SERPL-SCNC: 104 MMOL/L (ref 98–107)
CO2 SERPL-SCNC: 25 MMOL/L (ref 21–32)
CREAT SERPL-MCNC: 1.4 MG/DL (ref 0.7–1.3)
GLUCOSE SERPL-MCNC: 238 MG/DL (ref 74–106)
POTASSIUM SERPL-SCNC: 4.4 MMOL/L (ref 3.5–5.1)
SODIUM SERPL-SCNC: 140 MMOL/L (ref 136–145)

## 2021-12-30 NOTE — 2DMMODE
[address]   [address]                   2 D/M-MODE ECHOCARDIOGRAM     
_______________________________________________________________________________
 
Name:       FLORENTINO PERALTA MIGUEL ANGEL             Room #:         140-3       ADM IN  
M.R.#:      [id number]                       Account #:      [id number]  
Admission:  12/29/21    Attend Phys:    Adam Aaron MD 
Discharge:              Date of Birth:  [birth year]  
                                                          Report #: [id number]
                                                                    [id number]
_______________________________________________________________________________
THIS REPORT FOR:  
 
cc:  MICHELLE WEAVER MD                
     Physician not on staff        
     Christopher Hein MD                                        
                                                                       ~
 
--------------- APPROVED REPORT --------------
 
 
Study performed:  12/30/2021 09:43:37
 
EXAM: Comprehensive 2D, Doppler, and color-flow 
Echocardiogram 
Patient Location: ER    
Room #:  10     Status:  routine
 
      BSA:         2.65
HR: 96 bpm BP:          165/83 mmHg 
 
Indications
Congestive Heart Failure
Diabetes
Dyspnea 
Hypertension/HDD
 
Left Ventricle
The left ventricle is normal size. There is normal LV segmental wall 
motion. There is normal left ventricular wall thickness. Left 
ventricular systolic function is borderline. LVEF is 50%.
 
Right Ventricle
The right ventricle is normal size. The right ventricular systolic 
function is normal.
 
Atria
The left atrium size is normal. The right atrium size is 
normal.
 
Aortic Valve
The aortic valve is normal in structure. No aortic regurgitation is 
present. There is no aortic valvular stenosis.
 
Mitral Valve
The mitral valve is normal in structure. Mild mitral regurgitation. 
No evidence of mitral valve stenosis.
 
 
[address]  [address]
Phone:  [phone]                    2 D/M-MODE ECHOCARDIOGRAM     
_______________________________________________________________________________
 
Name:            FLORNETINO PERALTA             Room #:        140-3       ADM IN
M.R.#:           [id number]          Account #:     [id number]  
Admission:       12/29/21         Attend Phys:   Adam Aaron,
Discharge:                  Date of Birth: [birth year]  
                         Report #:      [id number]
        [id number]
_______________________________________________________________________________
 
Tricuspid Valve
The tricuspid valve is normal in structure. There is no tricuspid 
valve regurgitation noted.
 
Pulmonic Valve
The pulmonary valve is normal in structure.
 
Great Vessels
The aortic root is normal in size. IVC is normal in size and 
collapses >50% with inspiration.
 
Pericardium
There is no pericardial effusion.
 
<Conclusion>
The left ventricle is normal size.
LVEF is 50%.
The right ventricle is normal size.
The left atrium size is normal.
The aortic valve is normal in structure.
The mitral valve is normal in structure.
Mild mitral regurgitation.
The tricuspid valve is normal in structure.
The pulmonary valve is normal in structure.
The aortic root is normal in size.
There is no pericardial effusion.
 
 
 
 
 
 
 
 
 
 
 
 
 
 
 
 
 
  <ELECTRONICALLY SIGNED>
   By: Christopher Hein MD    
  12/30/21     1021
D: 12/30/21 1021                           _____________________________________
T: 12/30/21 1021                           Christopher Hein MD      /INF

## 2021-12-30 NOTE — EKG
[address] [employer]
[place]  [address]
Phone:  [phone]                    ELECTROCARDIOGRAM REPORT      
_______________________________________________________________________________
 
Name:       FLORENTINO PERALTA MIGUEL ANGEL             Room #:         140-3       ADM IN  
M.R.#:      [id number]     Account #:      [id number]  
Admission:  21    Attend Phys:    Adam Aaron MD 
Discharge:              Date of Birth:  [birth year]  
                                                          Report #: [id number]
   [id number]
_______________________________________________________________________________
                         [facility] ED
                                       
Test Date:    2021               Test Time:    14:50:22
Pat Name:     FLORENTINO PERALTA             Department:   
Patient ID:   [id number]            Room:         140
Gender:       M                        Technician:   LOU MOULTON
:          1965               Requested By: Isadora Miller
Order Number: [id number] MD:   Alexei Ocampo
                                 Measurements
Intervals                              Axis          
Rate:         112                      P:            50
MA:           145                      QRS:          75
QRSD:         102                      T:            79
QT:           337                                    
QTc:          460                                    
                           Interpretive Statements
Sinus tachycardia
Probable left atrial enlargement
Nonspecific T abnormalities, lateral leads
Artifact in lead(s) I,II,III,aVR,aVL,aVF,V2,V5,V6
Compared to ECG 2021 18:14:19
T-wave abnormality now present
Electronically Signed On 2021 7:42:19 CST by Alexei Ocampo
[email or web address]
 
 
 
 
 
 
 
 
 
 
 
 
 
 
 
 
 
 
 
  <ELECTRONICALLY SIGNED>
   By: Alexei Ocampo MD, FAC    
  21     0742
D: 21 1450                           _____________________________________
T: 21 1450                           Alexei Ocampo MD, [facility]      /EPI

## 2021-12-30 NOTE — EKG
[place] [address] [address] [facility] [address]
[place]  [address]
Phone:  [phone]                    ELECTROCARDIOGRAM REPORT      
_______________________________________________________________________________
 
Name:       FLORENTINO PERALTA MIGUEL ANGEL             Room #:         140-3       ADM IN  
M.R.#:      [id number]     Account #:      [id number]  
Admission:  21    Attend Phys:    Adam Aaron MD 
Discharge:              Date of Birth:  [birth year]  
                                                          Report #: [id number]
   [id number]
_______________________________________________________________________________
                         [facility] ED
                                       
Test Date:    2021               Test Time:    16:51:22
Pat Name:     FLORENTINO PERALTA             Department:   
Patient ID:   [id number]            Room:         [facility]
Gender:       M                        Technician:   [id number]
:          1965               Requested By: Isadora Miller
Order Number: [id number] MD:   Alexei Ocampo
                                 Measurements
Intervals                              Axis          
Rate:         110                      P:            112
LA:           154                      QRS:          123
QRSD:         92                       T:            128
QT:           380                                    
QTc:          515                                    
                           Interpretive Statements
Right and left arm electrode reversal, interpretation assumes no reversal
Sinus tachycardia
Probable left atrial enlargement
Artifact in lead(s) I,II,aVR,aVL,aVF
Compared to ECG 2021 14:50:22
T-wave abnormality no longer present
Electronically Signed On 2021 7:43:54 CST by Alexei Ocampo
[email or web address]
 
 
 
 
 
 
 
 
 
 
 
 
 
 
 
 
 
 
 
  <ELECTRONICALLY SIGNED>
   By: Alexei Ocampo MD, FACC    
  21     0743
D: 21 1651                           _____________________________________
T: 21 1651                           Alexei Ocampo MD, [facility]      /EPI

## 2021-12-30 NOTE — EKG
[address]  [address]
Phone:  [phone]                    ELECTROCARDIOGRAM REPORT      
_______________________________________________________________________________
 
Name:       FLORENTINO PERALTA MIGUEL ANGEL             Room #:         140-3       ADM IN  
M.R.#:      [id number]     Account #:      [id number]  
Admission:  21    Attend Phys:    Adam Aaron MD 
Discharge:              Date of Birth:  [birth year]  
                                                          Report #: [id number]
   [id number]
_______________________________________________________________________________
                         [facility] ED
                                       
Test Date:    2021               Test Time:    18:20:01
Pat Name:     FLORENTINO PERALTA             Department:   
Patient ID:   [id number]            Room:         140 3
Gender:       M                        Technician:   JENNIFER
:          1965               Requested By: Isadora Miller
Order Number: [id number] MD:   Alexei Ocampo
                                 Measurements
Intervals                              Axis          
Rate:         114                      P:            59
HI:           154                      QRS:          62
QRSD:         92                       T:            87
QT:           359                                    
QTc:          495                                    
                           Interpretive Statements
Sinus tachycardia
Left atrial enlargement
Baseline wander in lead(s) V1
Compared to ECG 2021 16:51:22
Myocardial infarct finding no longer present
Electronically Signed On 2021 7:44:22 CST by Alexei Ocampo
[email or web address]
 
 
 
 
 
 
 
 
 
 
 
 
 
 
 
 
 
 
 
 
  <ELECTRONICALLY SIGNED>
   By: Alexei Ocampo MD, [facility]    
  21     0744
D: 21                           _____________________________________
T: 21                           Alexei cOampo MD, [facility]      /EPI

## 2021-12-30 NOTE — NUR
PATIENT IS A NEW ADMISSION TO THE UNIT THIS SHIFT. HE WAS ABLE TO TRANSFER TO
THE BED WITH ASSISTANCE INCIDENT FREE. PATIENT IS FULLY ALERT AND ORIENTED AND
ABLE TO PARTICIPATE FULLY IN CARE AND CALL APPROPRIATELY FOR NEEDS. NURSE TO
COMPLETE ADMISSION AND INITIATE PLAN OF CARE.

## 2021-12-31 VITALS — SYSTOLIC BLOOD PRESSURE: 124 MMHG | DIASTOLIC BLOOD PRESSURE: 90 MMHG

## 2021-12-31 VITALS — DIASTOLIC BLOOD PRESSURE: 51 MMHG | SYSTOLIC BLOOD PRESSURE: 130 MMHG

## 2021-12-31 VITALS — SYSTOLIC BLOOD PRESSURE: 148 MMHG | DIASTOLIC BLOOD PRESSURE: 87 MMHG

## 2021-12-31 VITALS — DIASTOLIC BLOOD PRESSURE: 71 MMHG | SYSTOLIC BLOOD PRESSURE: 130 MMHG

## 2021-12-31 VITALS — DIASTOLIC BLOOD PRESSURE: 75 MMHG | SYSTOLIC BLOOD PRESSURE: 146 MMHG

## 2021-12-31 LAB
ANION GAP SERPL CALC-SCNC: 12 MMOL/L (ref 7–16)
BASOPHILS NFR BLD AUTO: 1 % (ref 0–2)
BUN SERPL-MCNC: 30 MG/DL (ref 7–18)
CALCIUM SERPL-MCNC: 9.3 MG/DL (ref 8.5–10.1)
CHLORIDE SERPL-SCNC: 99 MMOL/L (ref 98–107)
CO2 SERPL-SCNC: 29 MMOL/L (ref 21–32)
CREAT SERPL-MCNC: 1.4 MG/DL (ref 0.7–1.3)
EOSINOPHIL NFR BLD: 0.4 % (ref 0–3)
ERYTHROCYTE [DISTWIDTH] IN BLOOD BY AUTOMATED COUNT: 16.7 % (ref 10.5–14.5)
GLUCOSE SERPL-MCNC: 115 MG/DL (ref 74–106)
GRANULOCYTES NFR BLD MANUAL: 72.9 % (ref 36–66)
HCT VFR BLD CALC: 48.1 % (ref 42–52)
HGB BLD-MCNC: 15.6 GM/DL (ref 14–18)
LYMPHOCYTES NFR BLD AUTO: 15.7 % (ref 24–44)
MCH RBC QN AUTO: 28.6 PG (ref 26–34)
MCHC RBC AUTO-ENTMCNC: 32.5 G/DL (ref 28–37)
MCV RBC: 88 FL (ref 80–100)
MONOCYTES NFR BLD: 10 % (ref 1–8)
NEUTROPHILS # BLD: 7.7 THOU/UL (ref 1.4–8.2)
PLATELET # BLD: 262 THOU/UL (ref 150–400)
POTASSIUM SERPL-SCNC: 3.8 MMOL/L (ref 3.5–5.1)
RBC # BLD AUTO: 5.47 MIL/UL (ref 4.5–6)
SODIUM SERPL-SCNC: 140 MMOL/L (ref 136–145)
WBC # BLD AUTO: 10.6 THOU/UL (ref 4–11)

## 2021-12-31 NOTE — NUR
ASSESSMENT AS CHARTED - MEDS AS PER MAR -  NO CO'S OF PAIN OR NAUSEA.  ANDERS
DIET AND FLUIDS.  PT ACCUCHECKS AS CHARTED - COVERED PER [facility]SI PRN.  [facility] CATH
GOOD OUTPUT THIS SHIFT 2400CC.  PT WITH CO'S OF HEADACHE GIVEN TYLENOL WITH NO
FURTHER CO'S.  PT HAS RESTED IN BED THIS SHIFT -  NO CO'S AT THE PRESENT TIME.

## 2021-12-31 NOTE — NUR
Consulted r/t BMI 50.2, extreme class III obesity. Pt noted with hx multiple
admits. PMH: COPD, CHF, DMII, CKD, hx covid 9/2021, chronic BLE edema. With
long hx medical noncompliance, on insulin, diuretics. Current cardiac
abnormalities, stat EKG ordered, cardiology following. DM under fair control
with A1c 7.1 in Sept. Pt denies any change in weight or appetite recently and
declines need for nutrition education r/t DMII, CKD, or weight loss
strategies. Per [employer] wt hx, pts wt continues to increase. Currently up 10
lb from September, which could be fluid related. Follow weight and intake
trends. Low nutrition risk.

## 2021-12-31 NOTE — EKG
[place] [address] [facility]
[place]  [address]
Phone:  [phone]                    ELECTROCARDIOGRAM REPORT      
_______________________________________________________________________________
 
Name:       FLORENTINO PERALTA MIGUEL ANGEL             Room #:         208-P       ADM IN  
M.R.#:      [id number]     Account #:      [id number]  
Admission:  21    Attend Phys:    Adam Aaron MD 
Discharge:              Date of Birth:  [birth year]  
                                                          Report #: [id number]
   [id number]
_______________________________________________________________________________
                          [facility]
                                       
Test Date:    2021               Test Time:    03:22:00
Pat Name:     FLORENTINO PERALTA             Department:   
Patient ID:   [id number]            Room:         208 P
Gender:       M                        Technician:   AREN LAM
:          1965               Requested By: Gaston Linn
Order Number: [id number] MD:   Shoaib Gooden
                                 Measurements
Intervals                              Axis          
Rate:         89                       P:            62
MN:           166                      QRS:          63
QRSD:         95                       T:            112
QT:           415                                    
QTc:          505                                    
                           Interpretive Statements
Sinus rhythm
Nonspecific T abnormalities, lateral leads
Prolonged QT interval
Compared to ECG 2021 18:20:01
T-wave abnormality now present
Sinus tachycardia no longer present
Electronically Signed On 2021 11:31:39 CST by Shoaib Gooden
[email or web address]
 
 
 
 
 
 
 
 
 
 
 
 
 
 
 
 
 
 
 
  <ELECTRONICALLY SIGNED>
   By: Shoaib Gooden MD, [facility]   
  21     1131
D: 21 0322                           _____________________________________
T: 21 0322                           Shoaib Gooden MD, [facility]     /EPI

## 2022-01-01 VITALS — SYSTOLIC BLOOD PRESSURE: 139 MMHG | DIASTOLIC BLOOD PRESSURE: 91 MMHG

## 2022-01-01 VITALS — SYSTOLIC BLOOD PRESSURE: 145 MMHG | DIASTOLIC BLOOD PRESSURE: 77 MMHG

## 2022-01-01 VITALS — SYSTOLIC BLOOD PRESSURE: 118 MMHG | DIASTOLIC BLOOD PRESSURE: 73 MMHG

## 2022-01-01 VITALS — SYSTOLIC BLOOD PRESSURE: 115 MMHG | DIASTOLIC BLOOD PRESSURE: 78 MMHG

## 2022-01-01 VITALS — SYSTOLIC BLOOD PRESSURE: 102 MMHG | DIASTOLIC BLOOD PRESSURE: 62 MMHG

## 2022-01-01 LAB
ANION GAP SERPL CALC-SCNC: 8 MMOL/L (ref 7–16)
BUN SERPL-MCNC: 28 MG/DL (ref 7–18)
CALCIUM SERPL-MCNC: 8.7 MG/DL (ref 8.5–10.1)
CHLORIDE SERPL-SCNC: 100 MMOL/L (ref 98–107)
CO2 SERPL-SCNC: 28 MMOL/L (ref 21–32)
CREAT SERPL-MCNC: 1.2 MG/DL (ref 0.7–1.3)
GLUCOSE SERPL-MCNC: 108 MG/DL (ref 74–106)
POTASSIUM SERPL-SCNC: 3.7 MMOL/L (ref 3.5–5.1)
SODIUM SERPL-SCNC: 136 MMOL/L (ref 136–145)

## 2022-01-01 NOTE — NUR
Assumed care of pt this AM. Pt is A&O x4, received on 3L NC. SR on the
monitor. Pt denies any pain. At approx. 1445, pt reported not being able to
breathe. Nurse went into pts room, sat pt up, pt sating okay. RT was initially
called. Pt states he started feeling better after being sat up & thought
difficulty breathing may have been d/t information that was just told to him.
No issues since. Fall precautions in place. Frequent rounding in place.

## 2022-01-01 NOTE — NUR
Assumed care of pt this AM. Pt is A&O x4, on 2L NC, SR on the monitor. Pt with
reported difficulty breathing this morning. Pt put on sat probe & pt sating
high 90s. Sat pt up as he was laying down. Explain pt needs to stay up for the
most part. Pt reports he thinks maybe it was due to news he had received from
visitor. [facility] removed this shift. Approx. 1800, after pt was done in shower,
pt started crying, stating that his abd was hurting. Pt had no UOP after [facility]ina
removal, but reported he had urinated in the shower. This nurse & a 2nd nurse
bladder scanned pt, but was unable to find the bladder. Notified provider &
received orders for CT. Orders carried out.

## 2022-01-02 VITALS — SYSTOLIC BLOOD PRESSURE: 90 MMHG | DIASTOLIC BLOOD PRESSURE: 46 MMHG

## 2022-01-02 VITALS — DIASTOLIC BLOOD PRESSURE: 64 MMHG | SYSTOLIC BLOOD PRESSURE: 119 MMHG

## 2022-01-02 VITALS — SYSTOLIC BLOOD PRESSURE: 110 MMHG | DIASTOLIC BLOOD PRESSURE: 71 MMHG

## 2022-01-02 VITALS — SYSTOLIC BLOOD PRESSURE: 96 MMHG | DIASTOLIC BLOOD PRESSURE: 55 MMHG

## 2022-01-02 VITALS — DIASTOLIC BLOOD PRESSURE: 61 MMHG | SYSTOLIC BLOOD PRESSURE: 103 MMHG

## 2022-01-02 LAB
ANION GAP SERPL CALC-SCNC: 12 MMOL/L (ref 7–16)
BILIRUB UR-MCNC: NEGATIVE MG/DL
BUN SERPL-MCNC: 40 MG/DL (ref 7–18)
CALCIUM SERPL-MCNC: 9 MG/DL (ref 8.5–10.1)
CHLORIDE SERPL-SCNC: 96 MMOL/L (ref 98–107)
CO2 SERPL-SCNC: 25 MMOL/L (ref 21–32)
COLOR UR: YELLOW
CREAT SERPL-MCNC: 2.1 MG/DL (ref 0.7–1.3)
ERYTHROCYTE [DISTWIDTH] IN BLOOD BY AUTOMATED COUNT: 16.7 % (ref 10.5–14.5)
GLUCOSE SERPL-MCNC: 118 MG/DL (ref 74–106)
HCT VFR BLD CALC: 48.5 % (ref 42–52)
HGB BLD-MCNC: 15.8 GM/DL (ref 14–18)
HYALINE CASTS #/AREA URNS LPF: (no result) /LPF
KETONES UR STRIP-MCNC: NEGATIVE MG/DL
MCH RBC QN AUTO: 28.7 PG (ref 26–34)
MCHC RBC AUTO-ENTMCNC: 32.6 G/DL (ref 28–37)
MCV RBC: 87.9 FL (ref 80–100)
PLATELET # BLD: 258 THOU/UL (ref 150–400)
POTASSIUM SERPL-SCNC: 4.5 MMOL/L (ref 3.5–5.1)
RBC # BLD AUTO: 5.52 MIL/UL (ref 4.5–6)
RBC # UR STRIP: (no result) /UL
RBC #/AREA URNS HPF: (no result) /HPF
SODIUM SERPL-SCNC: 133 MMOL/L (ref 136–145)
SP GR UR STRIP: <= 1.005 (ref 1–1.03)
SQUAMOUS: (no result) /LPF (ref 0–3)
URINE CLARITY: CLEAR
URINE GLUCOSE-RANDOM*: NEGATIVE
URINE LEUKOCYTES-REFLEX: NEGATIVE
URINE NITRITE-REFLEX: NEGATIVE
URINE PROTEIN (DIPSTICK): NEGATIVE
UROBILINOGEN UR STRIP-ACNC: 0.2 E.U./DL (ref 0.2–1)
WBC # BLD AUTO: 11.7 THOU/UL (ref 4–11)

## 2022-01-02 NOTE — NUR
Assumed care of pt this AM. Pt is A&O x4, on 2L NC, Sr on the monitor. Pt
denies pain to lt hand but hand is still edematous. Pt using urinal at
bedside. Pt w/ no other complaints. Frequent rounding in place. Call light
within reach.

## 2022-01-02 NOTE — NUR
ALERT AND ORIENTEDX4, PT TAKEN FOR CT OF THE ABDOMEN AND PELVIS, IV TO THE R.
HAND INFILTRATED, L. HAND SWELLING NOTED, WARM COMPRESS APPLIED, REMAINS ON 2L
OF O2 VIA NASAL CANULA, O2SATS STABLE, SA/PVCS ON TELE, DENIES ABDOMINAL PAIN,
PT ABLE TO VOID THIS SHIFT, NO ACUTE DISTRESS NOTED, WILL CONTINUE TO MONITOR
PER POC

## 2022-01-03 VITALS — SYSTOLIC BLOOD PRESSURE: 119 MMHG | DIASTOLIC BLOOD PRESSURE: 69 MMHG

## 2022-01-03 VITALS — SYSTOLIC BLOOD PRESSURE: 139 MMHG | DIASTOLIC BLOOD PRESSURE: 73 MMHG

## 2022-01-03 VITALS — SYSTOLIC BLOOD PRESSURE: 123 MMHG | DIASTOLIC BLOOD PRESSURE: 67 MMHG

## 2022-01-03 VITALS — SYSTOLIC BLOOD PRESSURE: 109 MMHG | DIASTOLIC BLOOD PRESSURE: 74 MMHG

## 2022-01-03 VITALS — DIASTOLIC BLOOD PRESSURE: 78 MMHG | SYSTOLIC BLOOD PRESSURE: 118 MMHG

## 2022-01-03 LAB
ANION GAP SERPL CALC-SCNC: 10 MMOL/L (ref 7–16)
BUN SERPL-MCNC: 51 MG/DL (ref 7–18)
CALCIUM SERPL-MCNC: 8.5 MG/DL (ref 8.5–10.1)
CHLORIDE SERPL-SCNC: 100 MMOL/L (ref 98–107)
CO2 SERPL-SCNC: 26 MMOL/L (ref 21–32)
CREAT SERPL-MCNC: 2.9 MG/DL (ref 0.7–1.3)
GLUCOSE SERPL-MCNC: 121 MG/DL (ref 74–106)
POTASSIUM SERPL-SCNC: 4.4 MMOL/L (ref 3.5–5.1)
SODIUM SERPL-SCNC: 136 MMOL/L (ref 136–145)

## 2022-01-03 NOTE — NUR
CM FOLLOWING FOR DC NEEDS. THIS CM ATTEMPT TO ASSESS PT X 2. PT WAS SLEEPING
NO ASSESSMENT MADE. WILL CONTINUE TO FOLLOW FOR DC NEEDS.

## 2022-01-03 NOTE — NUR
PATIENT RESTED IN BED THROUGHOUT THE SHIFT. DENIED ANY COMPLAINTS THROUGHOUT.
NO BM TODAY. MODERATE URINARY OUTPUT.

## 2022-01-04 VITALS — DIASTOLIC BLOOD PRESSURE: 80 MMHG | SYSTOLIC BLOOD PRESSURE: 149 MMHG

## 2022-01-04 VITALS — DIASTOLIC BLOOD PRESSURE: 63 MMHG | SYSTOLIC BLOOD PRESSURE: 118 MMHG

## 2022-01-04 VITALS — DIASTOLIC BLOOD PRESSURE: 75 MMHG | SYSTOLIC BLOOD PRESSURE: 134 MMHG

## 2022-01-04 VITALS — DIASTOLIC BLOOD PRESSURE: 64 MMHG | SYSTOLIC BLOOD PRESSURE: 134 MMHG

## 2022-01-04 LAB
ANION GAP SERPL CALC-SCNC: 6 MMOL/L (ref 7–16)
BASOPHILS NFR BLD AUTO: 1 % (ref 0–2)
BUN SERPL-MCNC: 37 MG/DL (ref 7–18)
CALCIUM SERPL-MCNC: 8.7 MG/DL (ref 8.5–10.1)
CHLORIDE SERPL-SCNC: 102 MMOL/L (ref 98–107)
CO2 SERPL-SCNC: 28 MMOL/L (ref 21–32)
CREAT SERPL-MCNC: 1.8 MG/DL (ref 0.7–1.3)
EOSINOPHIL NFR BLD: 2 % (ref 0–3)
ERYTHROCYTE [DISTWIDTH] IN BLOOD BY AUTOMATED COUNT: 16.3 % (ref 10.5–14.5)
GLUCOSE SERPL-MCNC: 100 MG/DL (ref 74–106)
GRANULOCYTES NFR BLD MANUAL: 66.7 % (ref 36–66)
HCT VFR BLD CALC: 47.3 % (ref 42–52)
HGB BLD-MCNC: 14.8 GM/DL (ref 14–18)
LYMPHOCYTES NFR BLD AUTO: 18.4 % (ref 24–44)
MCH RBC QN AUTO: 28.1 PG (ref 26–34)
MCHC RBC AUTO-ENTMCNC: 31.4 G/DL (ref 28–37)
MCV RBC: 89.6 FL (ref 80–100)
MONOCYTES NFR BLD: 11.9 % (ref 1–8)
NEUTROPHILS # BLD: 6.9 THOU/UL (ref 1.4–8.2)
PLATELET # BLD: 240 THOU/UL (ref 150–400)
POTASSIUM SERPL-SCNC: 4.8 MMOL/L (ref 3.5–5.1)
RBC # BLD AUTO: 5.28 MIL/UL (ref 4.5–6)
SODIUM SERPL-SCNC: 136 MMOL/L (ref 136–145)
WBC # BLD AUTO: 10.4 THOU/UL (ref 4–11)

## 2022-01-04 NOTE — NUR
DISCHARGED PATIENT. EXPLAINED DISCHARGE INSTRUCTIONS TO PATIENT AND SPOUSE.
HIGHLIGHTED APPOINTMENTS AND NEW MEDICATIONS. PATIENT AND SPOUSE STATED THEY
UNDERSTOOD AND DENIED HAVING ANY FURTHER QUESTIONS. REMOVED IV WITH TIP INTACT
AND REMOVED CARDIAC MONITOR.

## 2022-01-04 NOTE — NUR
Chart review. 56 year old male, came to ED. He has been her in the past. Dx
acute hypoxemia resp failure and CHF. He been noncompliant with his medication
in the past. CHRISTIAN visited with katherine at bedside, noted on o2 per nasal cannula,
at home only uses oxygen  2L at bedtime. Lives at home with his wife,
children and foster kids. He helps his wife out as much as he can, she has
lupus. 3 steps to enter the home. No dme, manage own medication, no money for
medication, not taking any in a while. PCP Dr anna mitchell at [facility] in
[place]. Drives vehicle per katherine. Education on follow up with his pcp and cont
taking his medication. 1 month vouched medication for dc home. His wife will
be able to pick him up at [place]. Cm passed on information to bedside nurse to he
will get him medication before he dc from [facility].